# Patient Record
Sex: MALE | Race: OTHER | NOT HISPANIC OR LATINO | URBAN - METROPOLITAN AREA
[De-identification: names, ages, dates, MRNs, and addresses within clinical notes are randomized per-mention and may not be internally consistent; named-entity substitution may affect disease eponyms.]

---

## 2017-03-20 ENCOUNTER — OUTPATIENT (OUTPATIENT)
Dept: OUTPATIENT SERVICES | Facility: HOSPITAL | Age: 57
LOS: 1 days | End: 2017-03-20

## 2017-03-20 ENCOUNTER — APPOINTMENT (OUTPATIENT)
Dept: RADIOLOGY | Facility: HOSPITAL | Age: 57
End: 2017-03-20

## 2017-03-20 DIAGNOSIS — K44.9 DIAPHRAGMATIC HERNIA WITHOUT OBSTRUCTION OR GANGRENE: ICD-10-CM

## 2017-06-05 DIAGNOSIS — K29.70 GASTRITIS, UNSPECIFIED, W/OUT BLEEDING: ICD-10-CM

## 2017-06-05 DIAGNOSIS — K76.0 FATTY (CHANGE OF) LIVER, NOT ELSEWHERE CLASSIFIED: ICD-10-CM

## 2017-06-05 DIAGNOSIS — G47.33 OBSTRUCTIVE SLEEP APNEA (ADULT) (PEDIATRIC): ICD-10-CM

## 2017-06-05 DIAGNOSIS — D45 POLYCYTHEMIA VERA: ICD-10-CM

## 2017-06-05 DIAGNOSIS — B96.81 GASTRITIS, UNSPECIFIED, W/OUT BLEEDING: ICD-10-CM

## 2017-06-05 DIAGNOSIS — R73.03 PREDIABETES.: ICD-10-CM

## 2017-06-05 DIAGNOSIS — Z99.89 OBSTRUCTIVE SLEEP APNEA (ADULT) (PEDIATRIC): ICD-10-CM

## 2017-06-05 DIAGNOSIS — E78.00 PURE HYPERCHOLESTEROLEMIA, UNSPECIFIED: ICD-10-CM

## 2017-06-05 DIAGNOSIS — D64.9 ANEMIA, UNSPECIFIED: ICD-10-CM

## 2017-07-05 PROBLEM — D64.9 ANEMIA: Status: ACTIVE | Noted: 2017-07-05

## 2017-07-05 PROBLEM — G47.33 OSA ON CPAP: Status: ACTIVE | Noted: 2017-07-05

## 2017-07-05 PROBLEM — K76.0 FATTY INFILTRATION OF LIVER: Status: ACTIVE | Noted: 2017-07-05

## 2017-07-05 PROBLEM — K29.70 HELICOBACTER POSITIVE GASTRITIS: Status: ACTIVE | Noted: 2017-07-05

## 2017-07-05 PROBLEM — E78.00 HIGH CHOLESTEROL: Status: ACTIVE | Noted: 2017-07-05

## 2017-07-05 PROBLEM — R73.03 PREDIABETES: Status: ACTIVE | Noted: 2017-07-05

## 2017-07-05 PROBLEM — D45 POLYCYTHEMIA VERA: Status: ACTIVE | Noted: 2017-07-05

## 2017-07-10 ENCOUNTER — LABORATORY RESULT (OUTPATIENT)
Age: 57
End: 2017-07-10

## 2017-07-21 ENCOUNTER — APPOINTMENT (OUTPATIENT)
Dept: SURGERY | Facility: CLINIC | Age: 57
End: 2017-07-21

## 2017-07-27 ENCOUNTER — RESULT REVIEW (OUTPATIENT)
Age: 57
End: 2017-07-27

## 2017-07-27 ENCOUNTER — TRANSCRIPTION ENCOUNTER (OUTPATIENT)
Age: 57
End: 2017-07-27

## 2017-07-27 ENCOUNTER — INPATIENT (INPATIENT)
Facility: HOSPITAL | Age: 57
LOS: 0 days | Discharge: HOME | DRG: 343 | End: 2017-07-28
Attending: SURGERY | Admitting: SURGERY
Payer: COMMERCIAL

## 2017-07-27 VITALS
DIASTOLIC BLOOD PRESSURE: 87 MMHG | WEIGHT: 270.07 LBS | HEART RATE: 92 BPM | HEIGHT: 69 IN | TEMPERATURE: 99 F | SYSTOLIC BLOOD PRESSURE: 145 MMHG | RESPIRATION RATE: 18 BRPM | OXYGEN SATURATION: 92 %

## 2017-07-27 DIAGNOSIS — K37 UNSPECIFIED APPENDICITIS: ICD-10-CM

## 2017-07-27 DIAGNOSIS — Z98.890 OTHER SPECIFIED POSTPROCEDURAL STATES: Chronic | ICD-10-CM

## 2017-07-27 LAB
ALBUMIN SERPL ELPH-MCNC: 3.4 G/DL — LOW (ref 3.5–5)
ALP SERPL-CCNC: 90 U/L — SIGNIFICANT CHANGE UP (ref 40–120)
ALT FLD-CCNC: 39 U/L DA — SIGNIFICANT CHANGE UP (ref 10–60)
ANION GAP SERPL CALC-SCNC: 6 MMOL/L — SIGNIFICANT CHANGE UP (ref 5–17)
APTT BLD: 39 SEC — HIGH (ref 27.5–37.4)
AST SERPL-CCNC: 11 U/L — SIGNIFICANT CHANGE UP (ref 10–40)
BASOPHILS # BLD AUTO: 0.1 K/UL — SIGNIFICANT CHANGE UP (ref 0–0.2)
BASOPHILS NFR BLD AUTO: 1.4 % — SIGNIFICANT CHANGE UP (ref 0–2)
BILIRUB SERPL-MCNC: 0.9 MG/DL — SIGNIFICANT CHANGE UP (ref 0.2–1.2)
BUN SERPL-MCNC: 13 MG/DL — SIGNIFICANT CHANGE UP (ref 7–18)
CALCIUM SERPL-MCNC: 9 MG/DL — SIGNIFICANT CHANGE UP (ref 8.4–10.5)
CHLORIDE SERPL-SCNC: 103 MMOL/L — SIGNIFICANT CHANGE UP (ref 96–108)
CO2 SERPL-SCNC: 30 MMOL/L — SIGNIFICANT CHANGE UP (ref 22–31)
CREAT SERPL-MCNC: 0.92 MG/DL — SIGNIFICANT CHANGE UP (ref 0.5–1.3)
EOSINOPHIL # BLD AUTO: 0 K/UL — SIGNIFICANT CHANGE UP (ref 0–0.5)
EOSINOPHIL NFR BLD AUTO: 0.4 % — SIGNIFICANT CHANGE UP (ref 0–6)
GLUCOSE SERPL-MCNC: 95 MG/DL — SIGNIFICANT CHANGE UP (ref 70–99)
HCT VFR BLD CALC: 47.9 % — SIGNIFICANT CHANGE UP (ref 39–50)
HGB BLD-MCNC: 15.1 G/DL — SIGNIFICANT CHANGE UP (ref 13–17)
INR BLD: 1.17 RATIO — HIGH (ref 0.88–1.16)
LIDOCAIN IGE QN: 119 U/L — SIGNIFICANT CHANGE UP (ref 73–393)
LYMPHOCYTES # BLD AUTO: 3.4 K/UL — HIGH (ref 1–3.3)
LYMPHOCYTES # BLD AUTO: 32.6 % — SIGNIFICANT CHANGE UP (ref 13–44)
MCHC RBC-ENTMCNC: 24.9 PG — LOW (ref 27–34)
MCHC RBC-ENTMCNC: 31.6 GM/DL — LOW (ref 32–36)
MCV RBC AUTO: 78.7 FL — LOW (ref 80–100)
MONOCYTES # BLD AUTO: 0.7 K/UL — SIGNIFICANT CHANGE UP (ref 0–0.9)
MONOCYTES NFR BLD AUTO: 6.7 % — SIGNIFICANT CHANGE UP (ref 2–14)
NEUTROPHILS # BLD AUTO: 6.1 K/UL — SIGNIFICANT CHANGE UP (ref 1.8–7.4)
NEUTROPHILS NFR BLD AUTO: 58.9 % — SIGNIFICANT CHANGE UP (ref 43–77)
PLATELET # BLD AUTO: 272 K/UL — SIGNIFICANT CHANGE UP (ref 150–400)
POTASSIUM SERPL-MCNC: 3.4 MMOL/L — LOW (ref 3.5–5.3)
POTASSIUM SERPL-SCNC: 3.4 MMOL/L — LOW (ref 3.5–5.3)
PROT SERPL-MCNC: 7.7 G/DL — SIGNIFICANT CHANGE UP (ref 6–8.3)
PROTHROM AB SERPL-ACNC: 12.8 SEC — HIGH (ref 9.8–12.7)
RBC # BLD: 6.09 M/UL — HIGH (ref 4.2–5.8)
RBC # FLD: 13.9 % — SIGNIFICANT CHANGE UP (ref 10.3–14.5)
SODIUM SERPL-SCNC: 139 MMOL/L — SIGNIFICANT CHANGE UP (ref 135–145)
WBC # BLD: 10.4 K/UL — SIGNIFICANT CHANGE UP (ref 3.8–10.5)
WBC # FLD AUTO: 10.4 K/UL — SIGNIFICANT CHANGE UP (ref 3.8–10.5)

## 2017-07-27 PROCEDURE — 44970 LAPAROSCOPY APPENDECTOMY: CPT | Mod: AS

## 2017-07-27 PROCEDURE — 99285 EMERGENCY DEPT VISIT HI MDM: CPT

## 2017-07-27 PROCEDURE — 74177 CT ABD & PELVIS W/CONTRAST: CPT | Mod: 26

## 2017-07-27 PROCEDURE — 44970 LAPAROSCOPY APPENDECTOMY: CPT

## 2017-07-27 PROCEDURE — 99223 1ST HOSP IP/OBS HIGH 75: CPT | Mod: 57,AI

## 2017-07-27 PROCEDURE — 88304 TISSUE EXAM BY PATHOLOGIST: CPT | Mod: 26

## 2017-07-27 PROCEDURE — 71010: CPT | Mod: 26

## 2017-07-27 RX ORDER — METRONIDAZOLE 500 MG
500 TABLET ORAL EVERY 8 HOURS
Qty: 0 | Refills: 0 | Status: DISCONTINUED | OUTPATIENT
Start: 2017-07-27 | End: 2017-07-28

## 2017-07-27 RX ORDER — METRONIDAZOLE 500 MG
TABLET ORAL
Qty: 0 | Refills: 0 | Status: DISCONTINUED | OUTPATIENT
Start: 2017-07-27 | End: 2017-07-28

## 2017-07-27 RX ORDER — SODIUM CHLORIDE 9 MG/ML
3 INJECTION INTRAMUSCULAR; INTRAVENOUS; SUBCUTANEOUS ONCE
Qty: 0 | Refills: 0 | Status: COMPLETED | OUTPATIENT
Start: 2017-07-27 | End: 2017-07-27

## 2017-07-27 RX ORDER — METRONIDAZOLE 500 MG
500 TABLET ORAL ONCE
Qty: 0 | Refills: 0 | Status: COMPLETED | OUTPATIENT
Start: 2017-07-27 | End: 2017-07-27

## 2017-07-27 RX ORDER — CHOLECALCIFEROL (VITAMIN D3) 125 MCG
1000 CAPSULE ORAL ONCE
Qty: 0 | Refills: 0 | Status: DISCONTINUED | OUTPATIENT
Start: 2017-07-27 | End: 2017-07-28

## 2017-07-27 RX ORDER — AMLODIPINE BESYLATE 2.5 MG/1
10 TABLET ORAL DAILY
Qty: 0 | Refills: 0 | Status: DISCONTINUED | OUTPATIENT
Start: 2017-07-27 | End: 2017-07-28

## 2017-07-27 RX ORDER — LISINOPRIL 2.5 MG/1
40 TABLET ORAL DAILY
Qty: 0 | Refills: 0 | Status: DISCONTINUED | OUTPATIENT
Start: 2017-07-27 | End: 2017-07-28

## 2017-07-27 RX ORDER — MORPHINE SULFATE 50 MG/1
4 CAPSULE, EXTENDED RELEASE ORAL EVERY 4 HOURS
Qty: 0 | Refills: 0 | Status: DISCONTINUED | OUTPATIENT
Start: 2017-07-27 | End: 2017-07-28

## 2017-07-27 RX ORDER — ONDANSETRON 8 MG/1
4 TABLET, FILM COATED ORAL EVERY 6 HOURS
Qty: 0 | Refills: 0 | Status: DISCONTINUED | OUTPATIENT
Start: 2017-07-27 | End: 2017-07-28

## 2017-07-27 RX ORDER — FENTANYL CITRATE 50 UG/ML
25 INJECTION INTRAVENOUS
Qty: 0 | Refills: 0 | Status: DISCONTINUED | OUTPATIENT
Start: 2017-07-27 | End: 2017-07-27

## 2017-07-27 RX ORDER — ACETAMINOPHEN 500 MG
1000 TABLET ORAL ONCE
Qty: 0 | Refills: 0 | Status: DISCONTINUED | OUTPATIENT
Start: 2017-07-27 | End: 2017-07-27

## 2017-07-27 RX ORDER — ALLOPURINOL 300 MG
300 TABLET ORAL ONCE
Qty: 0 | Refills: 0 | Status: DISCONTINUED | OUTPATIENT
Start: 2017-07-27 | End: 2017-07-28

## 2017-07-27 RX ORDER — ENOXAPARIN SODIUM 100 MG/ML
40 INJECTION SUBCUTANEOUS DAILY
Qty: 0 | Refills: 0 | Status: DISCONTINUED | OUTPATIENT
Start: 2017-07-27 | End: 2017-07-28

## 2017-07-27 RX ORDER — MORPHINE SULFATE 50 MG/1
2 CAPSULE, EXTENDED RELEASE ORAL EVERY 4 HOURS
Qty: 0 | Refills: 0 | Status: DISCONTINUED | OUTPATIENT
Start: 2017-07-27 | End: 2017-07-28

## 2017-07-27 RX ORDER — OXYCODONE AND ACETAMINOPHEN 5; 325 MG/1; MG/1
1 TABLET ORAL EVERY 4 HOURS
Qty: 0 | Refills: 0 | Status: DISCONTINUED | OUTPATIENT
Start: 2017-07-27 | End: 2017-07-28

## 2017-07-27 RX ORDER — OXYCODONE AND ACETAMINOPHEN 5; 325 MG/1; MG/1
1 TABLET ORAL EVERY 4 HOURS
Qty: 0 | Refills: 0 | Status: DISCONTINUED | OUTPATIENT
Start: 2017-07-28 | End: 2017-07-28

## 2017-07-27 RX ORDER — CIPROFLOXACIN LACTATE 400MG/40ML
400 VIAL (ML) INTRAVENOUS EVERY 12 HOURS
Qty: 0 | Refills: 0 | Status: DISCONTINUED | OUTPATIENT
Start: 2017-07-28 | End: 2017-07-28

## 2017-07-27 RX ORDER — SODIUM CHLORIDE 9 MG/ML
1000 INJECTION, SOLUTION INTRAVENOUS
Qty: 0 | Refills: 0 | Status: DISCONTINUED | OUTPATIENT
Start: 2017-07-27 | End: 2017-07-27

## 2017-07-27 RX ORDER — HYDRALAZINE HCL 50 MG
10 TABLET ORAL ONCE
Qty: 0 | Refills: 0 | Status: COMPLETED | OUTPATIENT
Start: 2017-07-27 | End: 2017-07-27

## 2017-07-27 RX ORDER — CIPROFLOXACIN LACTATE 400MG/40ML
VIAL (ML) INTRAVENOUS
Qty: 0 | Refills: 0 | Status: DISCONTINUED | OUTPATIENT
Start: 2017-07-27 | End: 2017-07-28

## 2017-07-27 RX ORDER — MORPHINE SULFATE 50 MG/1
2 CAPSULE, EXTENDED RELEASE ORAL EVERY 6 HOURS
Qty: 0 | Refills: 0 | Status: DISCONTINUED | OUTPATIENT
Start: 2017-07-28 | End: 2017-07-28

## 2017-07-27 RX ORDER — CIPROFLOXACIN LACTATE 400MG/40ML
400 VIAL (ML) INTRAVENOUS ONCE
Qty: 0 | Refills: 0 | Status: COMPLETED | OUTPATIENT
Start: 2017-07-27 | End: 2017-07-27

## 2017-07-27 RX ORDER — SODIUM CHLORIDE 9 MG/ML
1000 INJECTION, SOLUTION INTRAVENOUS
Qty: 0 | Refills: 0 | Status: DISCONTINUED | OUTPATIENT
Start: 2017-07-27 | End: 2017-07-28

## 2017-07-27 RX ADMIN — SODIUM CHLORIDE 3 MILLILITER(S): 9 INJECTION INTRAMUSCULAR; INTRAVENOUS; SUBCUTANEOUS at 14:16

## 2017-07-27 RX ADMIN — Medication 100 MILLIGRAM(S): at 21:00

## 2017-07-27 RX ADMIN — Medication 200 MILLIGRAM(S): at 16:33

## 2017-07-27 RX ADMIN — Medication 100 MILLIGRAM(S): at 16:34

## 2017-07-27 RX ADMIN — Medication 10 MILLIGRAM(S): at 19:50

## 2017-07-27 NOTE — ED PROVIDER NOTE - MUSCULOSKELETAL, MLM
Spine appears normal, range of motion is not limited, no muscle or joint tenderness. No CVA tenderness b/l.

## 2017-07-27 NOTE — ED PROVIDER NOTE - ABDOMINAL EXAM
no rebound, no guarding/soft/nondistended/tender... MCBURNEY'S POINT TENDERNESS/tender.../no rebound, no guarding/nondistended/soft

## 2017-07-27 NOTE — ED PROVIDER NOTE - OBJECTIVE STATEMENT
55 y/o M with PMHx of HTN and PSHx of Cervical Neck Surgery presents to ED c/o diffuse abd pain x1 day. Pt reports abd pain is of gradual onset, and describes pain as sharp, aching, and 10/10 at its most severe. Pt denies fever, chills, nausea, vomiting, constipation, diarrhea, dysuria, hematuria, urinary frequency, urinary incontinence, or any other complaints. Pt also denies Hx of smoking, EtOH abuse, or drug use/abuse. Allergies: Penicillin (unknown).

## 2017-07-27 NOTE — ED PROVIDER NOTE - CHPI ED SYMPTOMS NEG
no vomiting/no constipation, no urinary frequency, no urinary incontinence/no chills/no dysuria/no diarrhea/no nausea/no hematuria/no fever

## 2017-07-27 NOTE — H&P ADULT - NSHPPHYSICALEXAM_GEN_ALL_CORE
General: uncomfortable. NAD  Abd: distended. diffusely tender but worse in the RLQ, +rebound tenderness with gaurding General: uncomfortable. NAD  Heent no icterus. left anterior fusion incision noted.  Abd: distended. diffusely tender but worse in the RLQ, +rebound tenderness with gaurding. Obese  Ext no edema  Psych affect appropriate  Neuro moves all extremities

## 2017-07-27 NOTE — ED ADULT NURSE NOTE - OBJECTIVE STATEMENT
AOX3 +ambulatory patient reports right lower quadrant pain started yesterday. Patient denies any NVD

## 2017-07-27 NOTE — ED PROVIDER NOTE - CONSTITUTIONAL, MLM
normal... Well appearing, well nourished, awake, alert, oriented to person, place, time/situation and in no apparent distress. Well appearing, well nourished, awake, alert, oriented to person, place, time/situation and in no apparent distress. Vital signs unremarkable in ED.
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
## 2017-07-27 NOTE — ED PROVIDER NOTE - MEDICAL DECISION MAKING DETAILS
55 y/o M pt presents with significant abd pain. Will r/o cholecystitis versus pancreatitis, pain control, and reassess.

## 2017-07-28 ENCOUNTER — TRANSCRIPTION ENCOUNTER (OUTPATIENT)
Age: 57
End: 2017-07-28

## 2017-07-28 VITALS
TEMPERATURE: 98 F | DIASTOLIC BLOOD PRESSURE: 62 MMHG | RESPIRATION RATE: 16 BRPM | SYSTOLIC BLOOD PRESSURE: 114 MMHG | OXYGEN SATURATION: 96 % | HEART RATE: 82 BPM

## 2017-07-28 DIAGNOSIS — E66.01 MORBID (SEVERE) OBESITY DUE TO EXCESS CALORIES: ICD-10-CM

## 2017-07-28 DIAGNOSIS — K35.80 UNSPECIFIED ACUTE APPENDICITIS: ICD-10-CM

## 2017-07-28 DIAGNOSIS — G89.18 OTHER ACUTE POSTPROCEDURAL PAIN: ICD-10-CM

## 2017-07-28 LAB
ANION GAP SERPL CALC-SCNC: 9 MMOL/L — SIGNIFICANT CHANGE UP (ref 5–17)
BASOPHILS # BLD AUTO: 0.1 K/UL — SIGNIFICANT CHANGE UP (ref 0–0.2)
BASOPHILS NFR BLD AUTO: 1.1 % — SIGNIFICANT CHANGE UP (ref 0–2)
BUN SERPL-MCNC: 9 MG/DL — SIGNIFICANT CHANGE UP (ref 7–18)
CALCIUM SERPL-MCNC: 8.7 MG/DL — SIGNIFICANT CHANGE UP (ref 8.4–10.5)
CHLORIDE SERPL-SCNC: 106 MMOL/L — SIGNIFICANT CHANGE UP (ref 96–108)
CO2 SERPL-SCNC: 27 MMOL/L — SIGNIFICANT CHANGE UP (ref 22–31)
CREAT SERPL-MCNC: 0.73 MG/DL — SIGNIFICANT CHANGE UP (ref 0.5–1.3)
EOSINOPHIL # BLD AUTO: 0 K/UL — SIGNIFICANT CHANGE UP (ref 0–0.5)
EOSINOPHIL NFR BLD AUTO: 0.3 % — SIGNIFICANT CHANGE UP (ref 0–6)
GLUCOSE SERPL-MCNC: 104 MG/DL — HIGH (ref 70–99)
HCT VFR BLD CALC: 47.5 % — SIGNIFICANT CHANGE UP (ref 39–50)
HGB BLD-MCNC: 14.6 G/DL — SIGNIFICANT CHANGE UP (ref 13–17)
LYMPHOCYTES # BLD AUTO: 2.7 K/UL — SIGNIFICANT CHANGE UP (ref 1–3.3)
LYMPHOCYTES # BLD AUTO: 29.2 % — SIGNIFICANT CHANGE UP (ref 13–44)
MCHC RBC-ENTMCNC: 24.9 PG — LOW (ref 27–34)
MCHC RBC-ENTMCNC: 30.9 GM/DL — LOW (ref 32–36)
MCV RBC AUTO: 80.7 FL — SIGNIFICANT CHANGE UP (ref 80–100)
MONOCYTES # BLD AUTO: 0.8 K/UL — SIGNIFICANT CHANGE UP (ref 0–0.9)
MONOCYTES NFR BLD AUTO: 8.8 % — SIGNIFICANT CHANGE UP (ref 2–14)
NEUTROPHILS # BLD AUTO: 5.6 K/UL — SIGNIFICANT CHANGE UP (ref 1.8–7.4)
NEUTROPHILS NFR BLD AUTO: 60.6 % — SIGNIFICANT CHANGE UP (ref 43–77)
PLATELET # BLD AUTO: 230 K/UL — SIGNIFICANT CHANGE UP (ref 150–400)
POTASSIUM SERPL-MCNC: 3.5 MMOL/L — SIGNIFICANT CHANGE UP (ref 3.5–5.3)
POTASSIUM SERPL-SCNC: 3.5 MMOL/L — SIGNIFICANT CHANGE UP (ref 3.5–5.3)
RBC # BLD: 5.88 M/UL — HIGH (ref 4.2–5.8)
RBC # FLD: 13.6 % — SIGNIFICANT CHANGE UP (ref 10.3–14.5)
SODIUM SERPL-SCNC: 142 MMOL/L — SIGNIFICANT CHANGE UP (ref 135–145)
WBC # BLD: 9.2 K/UL — SIGNIFICANT CHANGE UP (ref 3.8–10.5)
WBC # FLD AUTO: 9.2 K/UL — SIGNIFICANT CHANGE UP (ref 3.8–10.5)

## 2017-07-28 PROCEDURE — 80048 BASIC METABOLIC PNL TOTAL CA: CPT

## 2017-07-28 PROCEDURE — 86900 BLOOD TYPING SEROLOGIC ABO: CPT

## 2017-07-28 PROCEDURE — 88304 TISSUE EXAM BY PATHOLOGIST: CPT

## 2017-07-28 PROCEDURE — 96374 THER/PROPH/DIAG INJ IV PUSH: CPT | Mod: XU

## 2017-07-28 PROCEDURE — 71045 X-RAY EXAM CHEST 1 VIEW: CPT

## 2017-07-28 PROCEDURE — 99233 SBSQ HOSP IP/OBS HIGH 50: CPT

## 2017-07-28 PROCEDURE — 85610 PROTHROMBIN TIME: CPT

## 2017-07-28 PROCEDURE — 96375 TX/PRO/DX INJ NEW DRUG ADDON: CPT

## 2017-07-28 PROCEDURE — 99285 EMERGENCY DEPT VISIT HI MDM: CPT | Mod: 25

## 2017-07-28 PROCEDURE — 85730 THROMBOPLASTIN TIME PARTIAL: CPT

## 2017-07-28 PROCEDURE — 74177 CT ABD & PELVIS W/CONTRAST: CPT

## 2017-07-28 PROCEDURE — 80053 COMPREHEN METABOLIC PANEL: CPT

## 2017-07-28 PROCEDURE — 85027 COMPLETE CBC AUTOMATED: CPT

## 2017-07-28 PROCEDURE — 83690 ASSAY OF LIPASE: CPT

## 2017-07-28 PROCEDURE — 86850 RBC ANTIBODY SCREEN: CPT

## 2017-07-28 PROCEDURE — 86901 BLOOD TYPING SEROLOGIC RH(D): CPT

## 2017-07-28 PROCEDURE — 94660 CPAP INITIATION&MGMT: CPT

## 2017-07-28 RX ORDER — OXYCODONE HYDROCHLORIDE 5 MG/1
1 TABLET ORAL
Qty: 30 | Refills: 0 | OUTPATIENT
Start: 2017-07-28 | End: 2017-08-02

## 2017-07-28 RX ADMIN — Medication 100 MILLIGRAM(S): at 06:33

## 2017-07-28 RX ADMIN — MORPHINE SULFATE 2 MILLIGRAM(S): 50 CAPSULE, EXTENDED RELEASE ORAL at 07:27

## 2017-07-28 RX ADMIN — Medication 100 MILLIGRAM(S): at 14:05

## 2017-07-28 RX ADMIN — MORPHINE SULFATE 4 MILLIGRAM(S): 50 CAPSULE, EXTENDED RELEASE ORAL at 01:05

## 2017-07-28 RX ADMIN — MORPHINE SULFATE 2 MILLIGRAM(S): 50 CAPSULE, EXTENDED RELEASE ORAL at 08:21

## 2017-07-28 RX ADMIN — MORPHINE SULFATE 4 MILLIGRAM(S): 50 CAPSULE, EXTENDED RELEASE ORAL at 01:38

## 2017-07-28 RX ADMIN — AMLODIPINE BESYLATE 10 MILLIGRAM(S): 2.5 TABLET ORAL at 06:32

## 2017-07-28 RX ADMIN — Medication 200 MILLIGRAM(S): at 06:34

## 2017-07-28 RX ADMIN — LISINOPRIL 40 MILLIGRAM(S): 2.5 TABLET ORAL at 06:31

## 2017-07-28 RX ADMIN — SODIUM CHLORIDE 125 MILLILITER(S): 9 INJECTION, SOLUTION INTRAVENOUS at 01:21

## 2017-07-28 NOTE — DISCHARGE NOTE ADULT - PLAN OF CARE
wound healing s/p laparoscopic appendectomy please follow up with Dr. Lawson within 1 week   No heavy lifting or straining   Diet as tolerated continue current regimen and follow up with PCP

## 2017-07-28 NOTE — PROGRESS NOTE ADULT - SUBJECTIVE AND OBJECTIVE BOX
Post Operative Day #: 0    SUBJECTIVE:56yMale s/p lap appy, voided, no n/v, comfortable    Flatus: na           Bowel Movement: na    Pain Control Adequate: y    Nausea:    n       Vomiting: n    Diet:n      MEDICATIONS  (STANDING):  ciprofloxacin   IVPB   IV Intermittent   metroNIDAZOLE  IVPB   IV Intermittent   ciprofloxacin   IVPB 400 milliGRAM(s) IV Intermittent every 12 hours  metroNIDAZOLE  IVPB 500 milliGRAM(s) IV Intermittent every 8 hours  allopurinol 300 milliGRAM(s) Oral Once  cholecalciferol 1000 Unit(s) Oral Once  amLODIPine   Tablet 10 milliGRAM(s) Oral daily  lisinopril 40 milliGRAM(s) Oral daily  dextrose 5% + sodium chloride 0.45%. 1000 milliLiter(s) (125 mL/Hr) IV Continuous <Continuous>  enoxaparin Injectable 40 milliGRAM(s) SubCutaneous daily    MEDICATIONS  (PRN):  morphine  - Injectable 2 milliGRAM(s) IV Push every 4 hours PRN Moderate Pain (4 - 6)  oxyCODONE    5 mG/acetaminophen 325 mG 1 Tablet(s) Oral every 4 hours PRN Moderate Pain  ondansetron Injectable 4 milliGRAM(s) IV Push every 6 hours PRN Nausea  morphine  - Injectable 4 milliGRAM(s) IV Push every 4 hours PRN Severe Pain (7 - 10)      OBJECTIVE:  Vital Signs Last 24 Hrs  T(C): 37.4 (27 Jul 2017 22:40), Max: 37.4 (27 Jul 2017 22:40)  T(F): 99.3 (27 Jul 2017 22:40), Max: 99.3 (27 Jul 2017 22:40)  HR: 87 (27 Jul 2017 22:40) (71 - 97)  BP: 144/84 (27 Jul 2017 22:40) (126/82 - 164/110)  BP(mean): --  RR: 17 (27 Jul 2017 22:40) (17 - 21)  SpO2: 98% (27 Jul 2017 22:40) (92% - 100%)  I&O's Detail    27 Jul 2017 07:01  -  28 Jul 2017 00:13  --------------------------------------------------------  IN:    0.9% NaCl: 1000 mL    IV PiggyBack: 100 mL    lactated ringers.: 375 mL  Total IN: 1475 mL    OUT:  Total OUT: 0 mL    Total NET: 1475 mL                                15.1   10.4  )-----------( 272      ( 27 Jul 2017 14:17 )             47.9     27 Jul 2017 14:17    139    |  103    |  13     ----------------------------<  95     3.4     |  30     |  0.92     Ca    9.0        27 Jul 2017 14:17    TPro  7.7    /  Alb  3.4    /  TBili  0.9    /  DBili  x      /  AST  11     /  ALT  39     /  AlkPhos  90     27 Jul 2017 14:17    PT/INR - ( 27 Jul 2017 16:38 )   PT: 12.8 sec;   INR: 1.17 ratio         PTT - ( 27 Jul 2017 16:38 )  PTT:39.0 sec    Physical Exam:    Abdomen: soft, inc CDI, mild inc tenderness    Extremities: no c/c/e    stable post-op

## 2017-07-28 NOTE — DISCHARGE NOTE ADULT - PATIENT PORTAL LINK FT
“You can access the FollowHealth Patient Portal, offered by [facility], by registering with the following website: [email or web address]”
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
## 2017-07-28 NOTE — PROGRESS NOTE ADULT - SUBJECTIVE AND OBJECTIVE BOX
57 y/o male s/p laparoscopic appendectomy POD # 1. Patient examined at bedside, no complaints.   No nausea, no vomiting  Tolerating diet    T(F): 98.2 (07-28-17 @ 05:27), Max: 99.3 (07-27-17 @ 22:40)  HR: 73 (07-28-17 @ 05:27) (71 - 97)  BP: 129/82 (07-28-17 @ 05:27) (126/82 - 164/110)  RR: 16 (07-28-17 @ 05:27) (16 - 21)  SpO2: 98% (07-28-17 @ 05:27) (92% - 100%)  Wt(kg): --      07-27 @ 07:01  -  07-28 @ 07:00  --------------------------------------------------------  IN:    0.9% NaCl: 1000 mL    IV PiggyBack: 100 mL    lactated ringers.: 375 mL  Total IN: 1475 mL    OUT:  Total OUT: 0 mL    Total NET: 1475 mL                              14.6   9.2   )-----------( 230      ( 28 Jul 2017 06:04 )             47.5   07-28    142  |  106  |  9   ----------------------------<  104<H>  3.5   |  27  |  0.73    Ca    8.7      28 Jul 2017 06:04    TPro  7.7  /  Alb  3.4<L>  /  TBili  0.9  /  DBili  x   /  AST  11  /  ALT  39  /  AlkPhos  90  07-27        Physical Exam  General: AAOx3, No acute distress  Skin: No jaundice, no icterus  Abdomen: soft, mild perincisional tenderness, nondistended, no rebound tenderness, no guarding, no palpable masses  : Normal external genitalia  Extremities: non edematous, no calf pain bilaterally

## 2017-07-28 NOTE — DISCHARGE NOTE ADULT - OTHER SIGNIFICANT FINDINGS
Patient ID: [id number] Patient Name: GUICHO CHAVEZ   YOB: 1960 Sex: M        EXAM: CT ABDOMEN AND PELVIS OC IC      PROCEDURE DATE: 07/27/2017        INTERPRETATION: CT ABDOMEN AND PELVIS WITH IV CONTRAST    CLINICAL STATEMENT: Abdominal pain.    COMPARISON: None.    TECHNIQUE: CT scan of the abdomen and pelvis was performed with IV, with  oral contrast. Multiplanar reformations were made.    FINDINGS:  Bibasilar subsegmental atelectasis. No pleural effusion. Diffuse fatty  infiltration of the liver. Gallbladder, pancreas, spleen and adrenal glands  are unremarkable. Kidneys enhance symmetrically. No hydronephrosis. 1.6 cm  right renal cyst.    Appendix is enlarged, measuring 1.1 cm in greatest diameter, with  surrounding periappendiceal fat stranding. Mild wall thickening of the  adjacent cecum is noted are likely related to contiguous inflammation. No  evidence of abscess. GI tract is unobstructed. No free air.    No bulky adenopathy. Normal caliber abdominal aorta. Urinary bladder is  unremarkable. Prostate is mildly enlarged, measuring 5.2 cm in transverse  diameter. Seminal vesicles are symmetric. No free pelvic fluid. Mild  degenerative changes in the spine. Bilateral fat-containing inguinal hernias  are noted.    IMPRESSION:  Acute appendicitis. No free air or evidence of abscess.    Diffuse hepatic steatosis.    Findings were discussed with Dr. Yarbrough on 7/27/2017 3:42 PM with  readback.                DACIA CHRISTIANSON M.D., ATTENDING RADIOLOGIST  This document has been electronically signed. Jul 27 2017 3:46PM

## 2017-07-28 NOTE — PROGRESS NOTE ADULT - SUBJECTIVE AND OBJECTIVE BOX
Chief Complaint:     HPI:   56y Male s/p laparoscopic appendectomy POD # 1. Pt sitting in chair, NAD. Reports minimal incisional pain with movement. Tolerating regular diet well.  Denies nausea, vomiting. Reports passing gas.  States was ambulating without difficulties.        PAIN SCORE:         SCALE USED: 2/10 (1-10 VNRS)    Allergies    penicillin (Unknown)    Intolerances      MEDICATIONS  (STANDING):  ciprofloxacin   IVPB   IV Intermittent   metroNIDAZOLE  IVPB   IV Intermittent   ciprofloxacin   IVPB 400 milliGRAM(s) IV Intermittent every 12 hours  metroNIDAZOLE  IVPB 500 milliGRAM(s) IV Intermittent every 8 hours  allopurinol 300 milliGRAM(s) Oral Once  cholecalciferol 1000 Unit(s) Oral Once  amLODIPine   Tablet 10 milliGRAM(s) Oral daily  lisinopril 40 milliGRAM(s) Oral daily  dextrose 5% + sodium chloride 0.45%. 1000 milliLiter(s) (125 mL/Hr) IV Continuous <Continuous>  enoxaparin Injectable 40 milliGRAM(s) SubCutaneous daily    MEDICATIONS  (PRN):  ondansetron Injectable 4 milliGRAM(s) IV Push every 6 hours PRN Nausea  oxyCODONE    5 mG/acetaminophen 325 mG 1 Tablet(s) Oral every 4 hours PRN Moderate Pain (4 - 6)      PHYSICAL EXAM:    Vital Signs Last 24 Hrs  T(C): 36.7 (28 Jul 2017 15:03), Max: 37.4 (27 Jul 2017 22:40)  T(F): 98 (28 Jul 2017 15:03), Max: 99.3 (27 Jul 2017 22:40)  HR: 82 (28 Jul 2017 15:03) (71 - 97)  BP: 114/62 (28 Jul 2017 15:03) (114/62 - 164/110)  BP(mean): --  RR: 16 (28 Jul 2017 15:03) (16 - 21)  SpO2: 96% (28 Jul 2017 15:03) (93% - 100%)             LABS:                          14.6   9.2   )-----------( 230      ( 28 Jul 2017 06:04 )             47.5     07-28    142  |  106  |  9   ----------------------------<  104<H>  3.5   |  27  |  0.73    Ca    8.7      28 Jul 2017 06:04    TPro  7.7  /  Alb  3.4<L>  /  TBili  0.9  /  DBili  x   /  AST  11  /  ALT  39  /  AlkPhos  90  07-27    PT/INR - ( 27 Jul 2017 16:38 )   PT: 12.8 sec;   INR: 1.17 ratio         PTT - ( 27 Jul 2017 16:38 )  PTT:39.0 sec      Drug Screen:        RADIOLOGY:

## 2017-07-28 NOTE — DISCHARGE NOTE ADULT - CARE PLAN
Principal Discharge DX:	Acute appendicitis, unspecified acute appendicitis type  Goal:	wound healing  Instructions for follow-up, activity and diet:	s/p laparoscopic appendectomy please follow up with Dr. Lawson within 1 week   No heavy lifting or straining   Diet as tolerated  Secondary Diagnosis:	Gout  Secondary Diagnosis:	HTN (hypertension)  Goal:	continue current regimen and follow up with PCP

## 2017-07-28 NOTE — PROGRESS NOTE ADULT - PROBLEM SELECTOR PLAN 1
1. regular diet   2./ oob   3. prn pain control   4. d/c planning
sp lap appendectomy POD #1   -morphine discontinued  -Percocet as needed for moderate pain  -OOB, encouraged ambulation   -incentive spirometer reinforced   -discharge home today

## 2017-07-28 NOTE — DISCHARGE NOTE ADULT - MEDICATION SUMMARY - MEDICATIONS TO TAKE
I will START or STAY ON the medications listed below when I get home from the hospital:    acetaminophen-oxycodone 325 mg-5 mg oral tablet  -- 1 tab(s) by mouth every 4 hours, As needed, Moderate Pain (4 - 6) MDD:6 tabs  -- Indication: For prn pain     allopurinol 300 mg oral tablet  -- 1 tab(s) by mouth once a day  -- Indication: For Gout     amlodipine-benazepril 10 mg-40 mg oral capsule  -- 1 cap(s) by mouth once a day  -- Indication: For HTN (hypertension)    Vitamin D3 1000 intl units oral capsule  -- 1 cap(s) by mouth once a day  -- Indication: For vitamin

## 2017-07-31 DIAGNOSIS — Z99.81 DEPENDENCE ON SUPPLEMENTAL OXYGEN: ICD-10-CM

## 2017-07-31 DIAGNOSIS — E66.01 MORBID (SEVERE) OBESITY DUE TO EXCESS CALORIES: ICD-10-CM

## 2017-07-31 DIAGNOSIS — G47.33 OBSTRUCTIVE SLEEP APNEA (ADULT) (PEDIATRIC): ICD-10-CM

## 2017-07-31 DIAGNOSIS — K35.80 UNSPECIFIED ACUTE APPENDICITIS: ICD-10-CM

## 2017-07-31 DIAGNOSIS — M10.9 GOUT, UNSPECIFIED: ICD-10-CM

## 2017-07-31 DIAGNOSIS — Z88.0 ALLERGY STATUS TO PENICILLIN: ICD-10-CM

## 2017-07-31 DIAGNOSIS — I10 ESSENTIAL (PRIMARY) HYPERTENSION: ICD-10-CM

## 2017-08-04 ENCOUNTER — APPOINTMENT (OUTPATIENT)
Dept: BARIATRICS | Facility: CLINIC | Age: 57
End: 2017-08-04
Payer: COMMERCIAL

## 2017-08-04 VITALS
BODY MASS INDEX: 38.71 KG/M2 | HEIGHT: 69 IN | SYSTOLIC BLOOD PRESSURE: 129 MMHG | TEMPERATURE: 97.9 F | DIASTOLIC BLOOD PRESSURE: 85 MMHG | OXYGEN SATURATION: 98 % | WEIGHT: 261.38 LBS | HEART RATE: 54 BPM

## 2017-08-04 DIAGNOSIS — I10 ESSENTIAL (PRIMARY) HYPERTENSION: ICD-10-CM

## 2017-08-04 DIAGNOSIS — Z48.89 ENCOUNTER FOR OTHER SPECIFIED SURGICAL AFTERCARE: ICD-10-CM

## 2017-08-04 PROCEDURE — 99024 POSTOP FOLLOW-UP VISIT: CPT

## 2017-08-04 RX ORDER — PANTOPRAZOLE SODIUM 40 MG/1
40 GRANULE, DELAYED RELEASE ORAL
Refills: 0 | Status: DISCONTINUED | COMMUNITY
End: 2017-08-04

## 2017-08-04 RX ORDER — MULTIVIT-MIN/FOLIC/VIT K/LYCOP 400-300MCG
25 MCG TABLET ORAL
Refills: 0 | Status: ACTIVE | COMMUNITY

## 2017-08-23 ENCOUNTER — OUTPATIENT (OUTPATIENT)
Dept: OUTPATIENT SERVICES | Facility: HOSPITAL | Age: 57
LOS: 1 days | End: 2017-08-23
Payer: COMMERCIAL

## 2017-08-23 VITALS
HEART RATE: 70 BPM | SYSTOLIC BLOOD PRESSURE: 148 MMHG | DIASTOLIC BLOOD PRESSURE: 99 MMHG | HEIGHT: 69 IN | OXYGEN SATURATION: 97 % | WEIGHT: 268.96 LBS | TEMPERATURE: 98 F | RESPIRATION RATE: 16 BRPM

## 2017-08-23 DIAGNOSIS — G47.33 OBSTRUCTIVE SLEEP APNEA (ADULT) (PEDIATRIC): ICD-10-CM

## 2017-08-23 DIAGNOSIS — E66.01 MORBID (SEVERE) OBESITY DUE TO EXCESS CALORIES: ICD-10-CM

## 2017-08-23 DIAGNOSIS — Z90.49 ACQUIRED ABSENCE OF OTHER SPECIFIED PARTS OF DIGESTIVE TRACT: Chronic | ICD-10-CM

## 2017-08-23 DIAGNOSIS — Z01.818 ENCOUNTER FOR OTHER PREPROCEDURAL EXAMINATION: ICD-10-CM

## 2017-08-23 DIAGNOSIS — Z98.890 OTHER SPECIFIED POSTPROCEDURAL STATES: Chronic | ICD-10-CM

## 2017-08-23 DIAGNOSIS — Z98.1 ARTHRODESIS STATUS: Chronic | ICD-10-CM

## 2017-08-23 LAB
ALBUMIN SERPL ELPH-MCNC: 4.2 G/DL — SIGNIFICANT CHANGE UP (ref 3.3–5)
ALP SERPL-CCNC: 94 U/L — SIGNIFICANT CHANGE UP (ref 40–120)
ALT FLD-CCNC: 42 U/L — SIGNIFICANT CHANGE UP (ref 10–45)
ANION GAP SERPL CALC-SCNC: 16 MMOL/L — SIGNIFICANT CHANGE UP (ref 5–17)
AST SERPL-CCNC: 24 U/L — SIGNIFICANT CHANGE UP (ref 10–40)
BILIRUB SERPL-MCNC: 0.4 MG/DL — SIGNIFICANT CHANGE UP (ref 0.2–1.2)
BLD GP AB SCN SERPL QL: NEGATIVE — SIGNIFICANT CHANGE UP
BUN SERPL-MCNC: 22 MG/DL — SIGNIFICANT CHANGE UP (ref 7–23)
CALCIUM SERPL-MCNC: 9.6 MG/DL — SIGNIFICANT CHANGE UP (ref 8.4–10.5)
CHLORIDE SERPL-SCNC: 103 MMOL/L — SIGNIFICANT CHANGE UP (ref 96–108)
CO2 SERPL-SCNC: 25 MMOL/L — SIGNIFICANT CHANGE UP (ref 22–31)
CREAT SERPL-MCNC: 0.62 MG/DL — SIGNIFICANT CHANGE UP (ref 0.5–1.3)
GLUCOSE SERPL-MCNC: 87 MG/DL — SIGNIFICANT CHANGE UP (ref 70–99)
HBA1C BLD-MCNC: 6.1 % — HIGH (ref 4–5.6)
HCT VFR BLD CALC: 45.2 % — SIGNIFICANT CHANGE UP (ref 39–50)
HGB BLD-MCNC: 14.4 G/DL — SIGNIFICANT CHANGE UP (ref 13–17)
MCHC RBC-ENTMCNC: 24.2 PG — LOW (ref 27–34)
MCHC RBC-ENTMCNC: 31.9 GM/DL — LOW (ref 32–36)
MCV RBC AUTO: 75.8 FL — LOW (ref 80–100)
PLATELET # BLD AUTO: 289 K/UL — SIGNIFICANT CHANGE UP (ref 150–400)
POTASSIUM SERPL-MCNC: 4.4 MMOL/L — SIGNIFICANT CHANGE UP (ref 3.5–5.3)
POTASSIUM SERPL-SCNC: 4.4 MMOL/L — SIGNIFICANT CHANGE UP (ref 3.5–5.3)
PROT SERPL-MCNC: 8 G/DL — SIGNIFICANT CHANGE UP (ref 6–8.3)
RBC # BLD: 5.96 M/UL — HIGH (ref 4.2–5.8)
RBC # FLD: 15.6 % — HIGH (ref 10.3–14.5)
RH IG SCN BLD-IMP: POSITIVE — SIGNIFICANT CHANGE UP
SODIUM SERPL-SCNC: 144 MMOL/L — SIGNIFICANT CHANGE UP (ref 135–145)
WBC # BLD: 8.06 K/UL — SIGNIFICANT CHANGE UP (ref 3.8–10.5)
WBC # FLD AUTO: 8.06 K/UL — SIGNIFICANT CHANGE UP (ref 3.8–10.5)

## 2017-08-23 PROCEDURE — G0463: CPT

## 2017-08-23 PROCEDURE — 80053 COMPREHEN METABOLIC PANEL: CPT

## 2017-08-23 PROCEDURE — 83036 HEMOGLOBIN GLYCOSYLATED A1C: CPT

## 2017-08-23 PROCEDURE — 86900 BLOOD TYPING SEROLOGIC ABO: CPT

## 2017-08-23 PROCEDURE — 85027 COMPLETE CBC AUTOMATED: CPT

## 2017-08-23 PROCEDURE — 86901 BLOOD TYPING SEROLOGIC RH(D): CPT

## 2017-08-23 PROCEDURE — 86850 RBC ANTIBODY SCREEN: CPT

## 2017-08-23 RX ORDER — HEPARIN SODIUM 5000 [USP'U]/ML
5000 INJECTION INTRAVENOUS; SUBCUTANEOUS ONCE
Qty: 0 | Refills: 0 | Status: COMPLETED | OUTPATIENT
Start: 2017-09-12 | End: 2017-09-12

## 2017-08-23 RX ORDER — SODIUM CHLORIDE 9 MG/ML
3 INJECTION INTRAMUSCULAR; INTRAVENOUS; SUBCUTANEOUS EVERY 8 HOURS
Qty: 0 | Refills: 0 | Status: DISCONTINUED | OUTPATIENT
Start: 2017-09-12 | End: 2017-09-12

## 2017-08-23 RX ORDER — VANCOMYCIN HCL 1 G
1500 VIAL (EA) INTRAVENOUS ONCE
Qty: 0 | Refills: 0 | Status: DISCONTINUED | OUTPATIENT
Start: 2017-09-12 | End: 2017-09-13

## 2017-08-23 RX ORDER — LIDOCAINE HCL 20 MG/ML
0.2 VIAL (ML) INJECTION ONCE
Qty: 0 | Refills: 0 | Status: DISCONTINUED | OUTPATIENT
Start: 2017-09-12 | End: 2017-09-13

## 2017-08-23 NOTE — H&P PST ADULT - PRIMARY CARE PROVIDER
[facility] [phone] last seen one month ago Dr.Daphna-Raquel Trujillo # [phone] fax [phone] has appointment on 8/28/17
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
## 2017-08-23 NOTE — H&P PST ADULT - PSH
H/O neck surgery  2 surgeries. c3-c5 fusion ( front )  and c2-c7 fusion ( back )  S/P appendectomy  7/27/17 H/O spinal fusion  cervical  x2 :  c3-c5  ( front )  and c2-c7  ( back  )  S/P appendectomy  7/27/17

## 2017-08-28 ENCOUNTER — APPOINTMENT (OUTPATIENT)
Dept: SURGERY | Facility: CLINIC | Age: 57
End: 2017-08-28
Payer: COMMERCIAL

## 2017-08-28 PROCEDURE — 99215 OFFICE O/P EST HI 40 MIN: CPT

## 2017-09-12 ENCOUNTER — RESULT REVIEW (OUTPATIENT)
Age: 57
End: 2017-09-12

## 2017-09-12 ENCOUNTER — TRANSCRIPTION ENCOUNTER (OUTPATIENT)
Age: 57
End: 2017-09-12

## 2017-09-12 ENCOUNTER — APPOINTMENT (OUTPATIENT)
Dept: SURGERY | Facility: HOSPITAL | Age: 57
End: 2017-09-12
Payer: COMMERCIAL

## 2017-09-12 ENCOUNTER — INPATIENT (INPATIENT)
Facility: HOSPITAL | Age: 57
LOS: 0 days | Discharge: HOME | DRG: 621 | End: 2017-09-13
Attending: SURGERY | Admitting: SURGERY
Payer: COMMERCIAL

## 2017-09-12 VITALS
TEMPERATURE: 99 F | HEART RATE: 73 BPM | OXYGEN SATURATION: 97 % | DIASTOLIC BLOOD PRESSURE: 78 MMHG | SYSTOLIC BLOOD PRESSURE: 128 MMHG | HEIGHT: 69 IN | RESPIRATION RATE: 18 BRPM | WEIGHT: 259.04 LBS

## 2017-09-12 DIAGNOSIS — Z98.1 ARTHRODESIS STATUS: Chronic | ICD-10-CM

## 2017-09-12 DIAGNOSIS — E66.01 MORBID (SEVERE) OBESITY DUE TO EXCESS CALORIES: ICD-10-CM

## 2017-09-12 DIAGNOSIS — Z90.49 ACQUIRED ABSENCE OF OTHER SPECIFIED PARTS OF DIGESTIVE TRACT: Chronic | ICD-10-CM

## 2017-09-12 LAB
ANION GAP SERPL CALC-SCNC: 15 MMOL/L — SIGNIFICANT CHANGE UP (ref 5–17)
ANION GAP SERPL CALC-SCNC: 18 MMOL/L — HIGH (ref 5–17)
BASOPHILS # BLD AUTO: 0 K/UL — SIGNIFICANT CHANGE UP (ref 0–0.2)
BASOPHILS NFR BLD AUTO: 0.2 % — SIGNIFICANT CHANGE UP (ref 0–2)
BUN SERPL-MCNC: 10 MG/DL — SIGNIFICANT CHANGE UP (ref 7–23)
BUN SERPL-MCNC: 7 MG/DL — SIGNIFICANT CHANGE UP (ref 7–23)
CALCIUM SERPL-MCNC: 8.3 MG/DL — LOW (ref 8.4–10.5)
CALCIUM SERPL-MCNC: 8.8 MG/DL — SIGNIFICANT CHANGE UP (ref 8.4–10.5)
CHLORIDE SERPL-SCNC: 100 MMOL/L — SIGNIFICANT CHANGE UP (ref 96–108)
CHLORIDE SERPL-SCNC: 99 MMOL/L — SIGNIFICANT CHANGE UP (ref 96–108)
CO2 SERPL-SCNC: 21 MMOL/L — LOW (ref 22–31)
CO2 SERPL-SCNC: 23 MMOL/L — SIGNIFICANT CHANGE UP (ref 22–31)
CREAT SERPL-MCNC: 0.66 MG/DL — SIGNIFICANT CHANGE UP (ref 0.5–1.3)
CREAT SERPL-MCNC: 0.69 MG/DL — SIGNIFICANT CHANGE UP (ref 0.5–1.3)
EOSINOPHIL # BLD AUTO: 0.1 K/UL — SIGNIFICANT CHANGE UP (ref 0–0.5)
EOSINOPHIL NFR BLD AUTO: 1 % — SIGNIFICANT CHANGE UP (ref 0–6)
GLUCOSE SERPL-MCNC: 141 MG/DL — HIGH (ref 70–99)
GLUCOSE SERPL-MCNC: 154 MG/DL — HIGH (ref 70–99)
HCT VFR BLD CALC: 39.6 % — SIGNIFICANT CHANGE UP (ref 39–50)
HCT VFR BLD CALC: 44.9 % — SIGNIFICANT CHANGE UP (ref 39–50)
HGB BLD-MCNC: 13.1 G/DL — SIGNIFICANT CHANGE UP (ref 13–17)
HGB BLD-MCNC: 14.5 G/DL — SIGNIFICANT CHANGE UP (ref 13–17)
LYMPHOCYTES # BLD AUTO: 2.8 K/UL — SIGNIFICANT CHANGE UP (ref 1–3.3)
LYMPHOCYTES # BLD AUTO: 39.8 % — SIGNIFICANT CHANGE UP (ref 13–44)
MAGNESIUM SERPL-MCNC: 2.3 MG/DL — SIGNIFICANT CHANGE UP (ref 1.6–2.6)
MCHC RBC-ENTMCNC: 25.6 PG — LOW (ref 27–34)
MCHC RBC-ENTMCNC: 26.1 PG — LOW (ref 27–34)
MCHC RBC-ENTMCNC: 32.4 GM/DL — SIGNIFICANT CHANGE UP (ref 32–36)
MCHC RBC-ENTMCNC: 33 GM/DL — SIGNIFICANT CHANGE UP (ref 32–36)
MCV RBC AUTO: 79.1 FL — LOW (ref 80–100)
MCV RBC AUTO: 79.1 FL — LOW (ref 80–100)
MONOCYTES # BLD AUTO: 0.3 K/UL — SIGNIFICANT CHANGE UP (ref 0–0.9)
MONOCYTES NFR BLD AUTO: 4.5 % — SIGNIFICANT CHANGE UP (ref 2–14)
NEUTROPHILS # BLD AUTO: 3.8 K/UL — SIGNIFICANT CHANGE UP (ref 1.8–7.4)
NEUTROPHILS NFR BLD AUTO: 54.6 % — SIGNIFICANT CHANGE UP (ref 43–77)
PHOSPHATE SERPL-MCNC: 1.9 MG/DL — LOW (ref 2.5–4.5)
PHOSPHATE SERPL-MCNC: 3.1 MG/DL — SIGNIFICANT CHANGE UP (ref 2.5–4.5)
PLATELET # BLD AUTO: 216 K/UL — SIGNIFICANT CHANGE UP (ref 150–400)
PLATELET # BLD AUTO: 234 K/UL — SIGNIFICANT CHANGE UP (ref 150–400)
POTASSIUM SERPL-MCNC: 3.5 MMOL/L — SIGNIFICANT CHANGE UP (ref 3.5–5.3)
POTASSIUM SERPL-MCNC: 4.1 MMOL/L — SIGNIFICANT CHANGE UP (ref 3.5–5.3)
POTASSIUM SERPL-SCNC: 3.5 MMOL/L — SIGNIFICANT CHANGE UP (ref 3.5–5.3)
POTASSIUM SERPL-SCNC: 4.1 MMOL/L — SIGNIFICANT CHANGE UP (ref 3.5–5.3)
RBC # BLD: 5.01 M/UL — SIGNIFICANT CHANGE UP (ref 4.2–5.8)
RBC # BLD: 5.68 M/UL — SIGNIFICANT CHANGE UP (ref 4.2–5.8)
RBC # FLD: 13.4 % — SIGNIFICANT CHANGE UP (ref 10.3–14.5)
RBC # FLD: 13.7 % — SIGNIFICANT CHANGE UP (ref 10.3–14.5)
RH IG SCN BLD-IMP: POSITIVE — SIGNIFICANT CHANGE UP
SODIUM SERPL-SCNC: 137 MMOL/L — SIGNIFICANT CHANGE UP (ref 135–145)
SODIUM SERPL-SCNC: 139 MMOL/L — SIGNIFICANT CHANGE UP (ref 135–145)
WBC # BLD: 6.9 K/UL — SIGNIFICANT CHANGE UP (ref 3.8–10.5)
WBC # BLD: 7.2 K/UL — SIGNIFICANT CHANGE UP (ref 3.8–10.5)
WBC # FLD AUTO: 6.9 K/UL — SIGNIFICANT CHANGE UP (ref 3.8–10.5)
WBC # FLD AUTO: 7.2 K/UL — SIGNIFICANT CHANGE UP (ref 3.8–10.5)

## 2017-09-12 PROCEDURE — 88305 TISSUE EXAM BY PATHOLOGIST: CPT | Mod: 26

## 2017-09-12 PROCEDURE — 43775 LAP SLEEVE GASTRECTOMY: CPT

## 2017-09-12 PROCEDURE — 51702 INSERT TEMP BLADDER CATH: CPT | Mod: 59

## 2017-09-12 PROCEDURE — 93010 ELECTROCARDIOGRAM REPORT: CPT

## 2017-09-12 RX ORDER — PANTOPRAZOLE SODIUM 20 MG/1
40 TABLET, DELAYED RELEASE ORAL DAILY
Qty: 0 | Refills: 0 | Status: DISCONTINUED | OUTPATIENT
Start: 2017-09-12 | End: 2017-09-13

## 2017-09-12 RX ORDER — ACETAMINOPHEN 500 MG
1000 TABLET ORAL ONCE
Qty: 0 | Refills: 0 | Status: COMPLETED | OUTPATIENT
Start: 2017-09-12 | End: 2017-09-12

## 2017-09-12 RX ORDER — ACETAMINOPHEN 500 MG
1000 TABLET ORAL ONCE
Qty: 0 | Refills: 0 | Status: COMPLETED | OUTPATIENT
Start: 2017-09-12 | End: 2017-09-13

## 2017-09-12 RX ORDER — POTASSIUM CHLORIDE 20 MEQ
10 PACKET (EA) ORAL
Qty: 0 | Refills: 0 | Status: DISCONTINUED | OUTPATIENT
Start: 2017-09-12 | End: 2017-09-12

## 2017-09-12 RX ORDER — HYOSCYAMINE SULFATE 0.13 MG
0.12 TABLET ORAL EVERY 6 HOURS
Qty: 0 | Refills: 0 | Status: DISCONTINUED | OUTPATIENT
Start: 2017-09-12 | End: 2017-09-13

## 2017-09-12 RX ORDER — ACETAMINOPHEN 500 MG
1000 TABLET ORAL ONCE
Qty: 0 | Refills: 0 | Status: DISCONTINUED | OUTPATIENT
Start: 2017-09-12 | End: 2017-09-13

## 2017-09-12 RX ORDER — POTASSIUM PHOSPHATE, MONOBASIC POTASSIUM PHOSPHATE, DIBASIC 236; 224 MG/ML; MG/ML
15 INJECTION, SOLUTION INTRAVENOUS ONCE
Qty: 0 | Refills: 0 | Status: DISCONTINUED | OUTPATIENT
Start: 2017-09-12 | End: 2017-09-12

## 2017-09-12 RX ORDER — VANCOMYCIN HCL 1 G
1000 VIAL (EA) INTRAVENOUS ONCE
Qty: 0 | Refills: 0 | Status: COMPLETED | OUTPATIENT
Start: 2017-09-13 | End: 2017-09-13

## 2017-09-12 RX ORDER — KETOROLAC TROMETHAMINE 30 MG/ML
30 SYRINGE (ML) INJECTION EVERY 6 HOURS
Qty: 0 | Refills: 0 | Status: DISCONTINUED | OUTPATIENT
Start: 2017-09-12 | End: 2017-09-12

## 2017-09-12 RX ORDER — METOCLOPRAMIDE HCL 10 MG
10 TABLET ORAL ONCE
Qty: 0 | Refills: 0 | Status: DISCONTINUED | OUTPATIENT
Start: 2017-09-12 | End: 2017-09-13

## 2017-09-12 RX ORDER — FAMOTIDINE 10 MG/ML
20 INJECTION INTRAVENOUS ONCE
Qty: 0 | Refills: 0 | Status: COMPLETED | OUTPATIENT
Start: 2017-09-12 | End: 2017-09-12

## 2017-09-12 RX ORDER — SODIUM CHLORIDE 9 MG/ML
500 INJECTION, SOLUTION INTRAVENOUS ONCE
Qty: 0 | Refills: 0 | Status: COMPLETED | OUTPATIENT
Start: 2017-09-12 | End: 2017-09-12

## 2017-09-12 RX ORDER — SODIUM CHLORIDE 9 MG/ML
1000 INJECTION, SOLUTION INTRAVENOUS
Qty: 0 | Refills: 0 | Status: COMPLETED | OUTPATIENT
Start: 2017-09-12 | End: 2017-09-12

## 2017-09-12 RX ORDER — HYDROMORPHONE HYDROCHLORIDE 2 MG/ML
0.25 INJECTION INTRAMUSCULAR; INTRAVENOUS; SUBCUTANEOUS
Qty: 0 | Refills: 0 | Status: DISCONTINUED | OUTPATIENT
Start: 2017-09-12 | End: 2017-09-13

## 2017-09-12 RX ORDER — HYDRALAZINE HCL 50 MG
10 TABLET ORAL EVERY 6 HOURS
Qty: 0 | Refills: 0 | Status: DISCONTINUED | OUTPATIENT
Start: 2017-09-12 | End: 2017-09-13

## 2017-09-12 RX ORDER — ONDANSETRON 8 MG/1
4 TABLET, FILM COATED ORAL EVERY 6 HOURS
Qty: 0 | Refills: 0 | Status: DISCONTINUED | OUTPATIENT
Start: 2017-09-12 | End: 2017-09-13

## 2017-09-12 RX ORDER — SODIUM CHLORIDE 9 MG/ML
1000 INJECTION, SOLUTION INTRAVENOUS
Qty: 0 | Refills: 0 | Status: DISCONTINUED | OUTPATIENT
Start: 2017-09-12 | End: 2017-09-13

## 2017-09-12 RX ORDER — FENTANYL CITRATE 50 UG/ML
25 INJECTION INTRAVENOUS
Qty: 0 | Refills: 0 | Status: DISCONTINUED | OUTPATIENT
Start: 2017-09-12 | End: 2017-09-12

## 2017-09-12 RX ORDER — PANTOPRAZOLE SODIUM 20 MG/1
40 TABLET, DELAYED RELEASE ORAL ONCE
Qty: 0 | Refills: 0 | Status: COMPLETED | OUTPATIENT
Start: 2017-09-12 | End: 2017-09-12

## 2017-09-12 RX ORDER — SODIUM CHLORIDE 9 MG/ML
1000 INJECTION, SOLUTION INTRAVENOUS
Qty: 0 | Refills: 0 | Status: DISCONTINUED | OUTPATIENT
Start: 2017-09-12 | End: 2017-09-12

## 2017-09-12 RX ORDER — POTASSIUM CHLORIDE 20 MEQ
10 PACKET (EA) ORAL
Qty: 0 | Refills: 0 | Status: COMPLETED | OUTPATIENT
Start: 2017-09-12 | End: 2017-09-12

## 2017-09-12 RX ORDER — HEPARIN SODIUM 5000 [USP'U]/ML
5000 INJECTION INTRAVENOUS; SUBCUTANEOUS EVERY 8 HOURS
Qty: 0 | Refills: 0 | Status: DISCONTINUED | OUTPATIENT
Start: 2017-09-12 | End: 2017-09-13

## 2017-09-12 RX ADMIN — Medication 1000 MILLIGRAM(S): at 17:33

## 2017-09-12 RX ADMIN — Medication 0.12 MILLIGRAM(S): at 19:51

## 2017-09-12 RX ADMIN — SODIUM CHLORIDE 1500 MILLILITER(S): 9 INJECTION, SOLUTION INTRAVENOUS at 21:41

## 2017-09-12 RX ADMIN — PANTOPRAZOLE SODIUM 40 MILLIGRAM(S): 20 TABLET, DELAYED RELEASE ORAL at 12:08

## 2017-09-12 RX ADMIN — FENTANYL CITRATE 25 MICROGRAM(S): 50 INJECTION INTRAVENOUS at 12:00

## 2017-09-12 RX ADMIN — FAMOTIDINE 20 MILLIGRAM(S): 10 INJECTION INTRAVENOUS at 12:59

## 2017-09-12 RX ADMIN — HEPARIN SODIUM 5000 UNIT(S): 5000 INJECTION INTRAVENOUS; SUBCUTANEOUS at 07:07

## 2017-09-12 RX ADMIN — SODIUM CHLORIDE 150 MILLILITER(S): 9 INJECTION, SOLUTION INTRAVENOUS at 11:44

## 2017-09-12 RX ADMIN — ONDANSETRON 4 MILLIGRAM(S): 8 TABLET, FILM COATED ORAL at 19:54

## 2017-09-12 RX ADMIN — Medication 0.12 MILLIGRAM(S): at 12:08

## 2017-09-12 RX ADMIN — HEPARIN SODIUM 5000 UNIT(S): 5000 INJECTION INTRAVENOUS; SUBCUTANEOUS at 21:37

## 2017-09-12 RX ADMIN — Medication 400 MILLIGRAM(S): at 16:59

## 2017-09-12 RX ADMIN — FENTANYL CITRATE 25 MICROGRAM(S): 50 INJECTION INTRAVENOUS at 11:31

## 2017-09-12 RX ADMIN — Medication 100 MILLIEQUIVALENT(S): at 13:42

## 2017-09-12 RX ADMIN — Medication 100 MILLIEQUIVALENT(S): at 12:37

## 2017-09-12 RX ADMIN — HYDROMORPHONE HYDROCHLORIDE 0.25 MILLIGRAM(S): 2 INJECTION INTRAMUSCULAR; INTRAVENOUS; SUBCUTANEOUS at 14:00

## 2017-09-12 RX ADMIN — Medication 100 MILLIEQUIVALENT(S): at 15:08

## 2017-09-12 RX ADMIN — Medication 1000 MILLIGRAM(S): at 20:19

## 2017-09-12 RX ADMIN — Medication 30 MILLIGRAM(S): at 22:07

## 2017-09-12 RX ADMIN — HYDROMORPHONE HYDROCHLORIDE 0.25 MILLIGRAM(S): 2 INJECTION INTRAMUSCULAR; INTRAVENOUS; SUBCUTANEOUS at 13:28

## 2017-09-12 RX ADMIN — SODIUM CHLORIDE 150 MILLILITER(S): 9 INJECTION, SOLUTION INTRAVENOUS at 21:41

## 2017-09-12 RX ADMIN — FENTANYL CITRATE 25 MICROGRAM(S): 50 INJECTION INTRAVENOUS at 12:07

## 2017-09-12 RX ADMIN — Medication 30 MILLIGRAM(S): at 17:33

## 2017-09-12 RX ADMIN — ONDANSETRON 4 MILLIGRAM(S): 8 TABLET, FILM COATED ORAL at 11:53

## 2017-09-12 RX ADMIN — SODIUM CHLORIDE 3 MILLILITER(S): 9 INJECTION INTRAMUSCULAR; INTRAVENOUS; SUBCUTANEOUS at 07:09

## 2017-09-12 RX ADMIN — Medication 30 MILLIGRAM(S): at 21:37

## 2017-09-12 RX ADMIN — FENTANYL CITRATE 25 MICROGRAM(S): 50 INJECTION INTRAVENOUS at 12:41

## 2017-09-12 RX ADMIN — Medication 30 MILLIGRAM(S): at 15:40

## 2017-09-12 RX ADMIN — Medication 400 MILLIGRAM(S): at 19:49

## 2017-09-12 RX ADMIN — SODIUM CHLORIDE 250 MILLILITER(S): 9 INJECTION, SOLUTION INTRAVENOUS at 17:21

## 2017-09-12 RX ADMIN — HEPARIN SODIUM 5000 UNIT(S): 5000 INJECTION INTRAVENOUS; SUBCUTANEOUS at 15:40

## 2017-09-12 NOTE — BRIEF OPERATIVE NOTE - POST-OP DX
Morbid obesity due to excess calories  09/12/2017    Active  Adelia Sprague Morbid obesity due to excess calories  09/12/2017    Active  Adelia Sprague  Obstructive sleep apnea on CPAP  09/12/2017    Active  Adelia Sprague

## 2017-09-12 NOTE — PHARMACY COMMUNICATION NOTE - COMMENTS
Patient medication reconciliation done. Patient currently taking: allopurinol 300mg tab by mouth daily at noon, amlodipine-benazapril 10-40mg by mouth daily at noon and vitamin D3 1000 units by mouth at noon. Patient was re-educated to take daily multi-vitamins post surgically. Patient re-educated on NSAID avoidance (Ibuprofen, ASA, naproxen, aleve) as they increased risk of GI bleeding. Patient educated to use APAP for mild pain otherwise contact prescriber for consult. Patient was instructed to crush allopurinol, amlodipine, and vitamin D3 and take as directed.

## 2017-09-12 NOTE — BRIEF OPERATIVE NOTE - PROCEDURE
Gastrectomy, sleeve, laparoscopic  09/12/2017    Active  ASAF Gastrectomy, sleeve, laparoscopic  09/12/2017    Active  Adelia Sprague

## 2017-09-12 NOTE — PATIENT PROFILE ADULT. - PSH
H/O spinal fusion  cervical  x2 :  c3-c5  ( front )  and c2-c7  ( back  )  S/P appendectomy  7/27/17

## 2017-09-12 NOTE — BRIEF OPERATIVE NOTE - OPERATION/FINDINGS
Sleeve gastrectomy performed over 36-Fr calibration tube. Negative leak test. Staple line reinforced with sutures and Evicel.

## 2017-09-12 NOTE — PROGRESS NOTE ADULT - SUBJECTIVE AND OBJECTIVE BOX
Bariatric Surgery POST OP Note    Post Op Day#: 0    Subjective:     No acute issues   Pain controlled with medications  Nausea with 2 episodes of small emesis, controlled with anti-emetics   Voiding - 1000cc with Karan Kwan CP and SHARI      Procedure:  Gastrectomy, sleeve, laparoscopic      Vital Signs Last 24 Hrs  T(C): 36.5 (12 Sep 2017 15:30), Max: 37 (12 Sep 2017 07:10)  T(F): 97.7 (12 Sep 2017 15:30), Max: 98.6 (12 Sep 2017 07:10)  HR: 70 (12 Sep 2017 16:00) (68 - 88)  BP: 102/59 (12 Sep 2017 16:00) (102/59 - 161/87)  BP(mean): 77 (12 Sep 2017 16:00) (77 - 116)  RR: 17 (12 Sep 2017 16:00) (17 - 18)  SpO2: 95% (12 Sep 2017 16:00) (93% - 97%)  Height (cm): 175.26 (09-12 @ 07:10)  Weight (kg): 117.5 (09-12 @ 07:10)  BMI (kg/m2): 38.3 (09-12 @ 07:10)  BSA (m2): 2.31 (09-12 @ 07:10)  I&O's Summary    12 Sep 2017 07:01  -  12 Sep 2017 16:41  --------------------------------------------------------  IN: 825 mL / OUT: 1000 mL / NET: -175 mL      I&O's Detail    12 Sep 2017 07:01  -  12 Sep 2017 16:41  --------------------------------------------------------  IN:    IV PiggyBack: 375 mL    lactated ringers.: 450 mL  Total IN: 825 mL    OUT:    Indwelling Catheter - Urethral: 1000 mL  Total OUT: 1000 mL    Total NET: -175 mL          Physical Exam:    General:  Appears stated age, well-groomed, well-nourished, no distress  Chest:  clear breath sounds  Cardiovascular:  Regular rate & rhythm  Abdomen:  Soft, incisions clean, dry intact, tenderness around incisions, no rebound or guarding  Skin:  No rash  Neuro/Psych:  Alert, oriented to time, place and person                           14.5   6.9   )-----------( 216      ( 12 Sep 2017 11:40 )             44.9       09-12    137  |  99  |  10  ----------------------------<  154<H>  3.5   |  23  |  0.69    Ca    8.8      12 Sep 2017 11:40  Phos  1.9     09-12  Mg     2.3     09-12        vancomycin  IVPB milliGRAM(s) IV Intermittent once  lidocaine 1% Injectable milliLiter(s) Local Injection once PRN  heparin  Injectable Unit(s) SubCutaneous every 8 hours  lactated ringers. milliLiter(s) IV Continuous <Continuous>  ondansetron Injectable milliGRAM(s) IV Push every 6 hours  aluminum hydroxide/magnesium hydroxide/simethicone Suspension milliLiter(s) Oral every 4 hours PRN  pantoprazole  Injectable milliGRAM(s) IV Push daily  hyoscyamine SL milliGRAM(s) SubLingual every 6 hours  acetaminophen  IVPB. milliGRAM(s) IV Intermittent once  acetaminophen  IVPB. milliGRAM(s) IV Intermittent once  acetaminophen  IVPB. milliGRAM(s) IV Intermittent once  acetaminophen  IVPB. milliGRAM(s) IV Intermittent once  hydrALAZINE Injectable milliGRAM(s) IV Push every 6 hours PRN  multivitamin/thiamine/folic acid in sodium chloride 0.9% milliLiter(s) IV Continuous <Continuous>  metoclopramide Injectable milliGRAM(s) IV Push once PRN  HYDROmorphone  Injectable milliGRAM(s) IV Push every 10 minutes PRN  potassium phosphate IVPB milliMole(s) IV Intermittent once          Assessment and Plan:  56y y/o Male s/p Gastrectomy, sleeve, laparoscopic  , POD # 0    - Start PO liquid oxycodone PRN for pain  - Continue ambulation   - Encourage Incentive Spirometer use  - Bariartic clears   - Continue chemical and mechanical DVT prophylaxis  - Discharge home once tolerating adequate PO and 8 point discharge criteria met

## 2017-09-13 ENCOUNTER — TRANSCRIPTION ENCOUNTER (OUTPATIENT)
Age: 57
End: 2017-09-13

## 2017-09-13 VITALS
OXYGEN SATURATION: 95 % | HEART RATE: 89 BPM | RESPIRATION RATE: 18 BRPM | DIASTOLIC BLOOD PRESSURE: 86 MMHG | TEMPERATURE: 99 F | SYSTOLIC BLOOD PRESSURE: 138 MMHG

## 2017-09-13 LAB
ANION GAP SERPL CALC-SCNC: 17 MMOL/L — SIGNIFICANT CHANGE UP (ref 5–17)
BASOPHILS # BLD AUTO: 0 K/UL — SIGNIFICANT CHANGE UP (ref 0–0.2)
BASOPHILS NFR BLD AUTO: 0.1 % — SIGNIFICANT CHANGE UP (ref 0–2)
BUN SERPL-MCNC: 7 MG/DL — SIGNIFICANT CHANGE UP (ref 7–23)
CALCIUM SERPL-MCNC: 9.2 MG/DL — SIGNIFICANT CHANGE UP (ref 8.4–10.5)
CHLORIDE SERPL-SCNC: 99 MMOL/L — SIGNIFICANT CHANGE UP (ref 96–108)
CO2 SERPL-SCNC: 23 MMOL/L — SIGNIFICANT CHANGE UP (ref 22–31)
CREAT SERPL-MCNC: 0.82 MG/DL — SIGNIFICANT CHANGE UP (ref 0.5–1.3)
EOSINOPHIL # BLD AUTO: 0 K/UL — SIGNIFICANT CHANGE UP (ref 0–0.5)
EOSINOPHIL NFR BLD AUTO: 0.1 % — SIGNIFICANT CHANGE UP (ref 0–6)
GLUCOSE SERPL-MCNC: 107 MG/DL — HIGH (ref 70–99)
HCT VFR BLD CALC: 41.6 % — SIGNIFICANT CHANGE UP (ref 39–50)
HGB BLD-MCNC: 13.6 G/DL — SIGNIFICANT CHANGE UP (ref 13–17)
LYMPHOCYTES # BLD AUTO: 2.5 K/UL — SIGNIFICANT CHANGE UP (ref 1–3.3)
LYMPHOCYTES # BLD AUTO: 24.8 % — SIGNIFICANT CHANGE UP (ref 13–44)
MAGNESIUM SERPL-MCNC: 2 MG/DL — SIGNIFICANT CHANGE UP (ref 1.6–2.6)
MCHC RBC-ENTMCNC: 25.8 PG — LOW (ref 27–34)
MCHC RBC-ENTMCNC: 32.7 GM/DL — SIGNIFICANT CHANGE UP (ref 32–36)
MCV RBC AUTO: 79 FL — LOW (ref 80–100)
MONOCYTES # BLD AUTO: 0.8 K/UL — SIGNIFICANT CHANGE UP (ref 0–0.9)
MONOCYTES NFR BLD AUTO: 7.5 % — SIGNIFICANT CHANGE UP (ref 2–14)
NEUTROPHILS # BLD AUTO: 6.8 K/UL — SIGNIFICANT CHANGE UP (ref 1.8–7.4)
NEUTROPHILS NFR BLD AUTO: 67.5 % — SIGNIFICANT CHANGE UP (ref 43–77)
PHOSPHATE SERPL-MCNC: 3.2 MG/DL — SIGNIFICANT CHANGE UP (ref 2.5–4.5)
PLATELET # BLD AUTO: 268 K/UL — SIGNIFICANT CHANGE UP (ref 150–400)
POTASSIUM SERPL-MCNC: 3.8 MMOL/L — SIGNIFICANT CHANGE UP (ref 3.5–5.3)
POTASSIUM SERPL-SCNC: 3.8 MMOL/L — SIGNIFICANT CHANGE UP (ref 3.5–5.3)
RBC # BLD: 5.26 M/UL — SIGNIFICANT CHANGE UP (ref 4.2–5.8)
RBC # FLD: 13.7 % — SIGNIFICANT CHANGE UP (ref 10.3–14.5)
SODIUM SERPL-SCNC: 139 MMOL/L — SIGNIFICANT CHANGE UP (ref 135–145)
WBC # BLD: 10 K/UL — SIGNIFICANT CHANGE UP (ref 3.8–10.5)
WBC # FLD AUTO: 10 K/UL — SIGNIFICANT CHANGE UP (ref 3.8–10.5)

## 2017-09-13 PROCEDURE — 86901 BLOOD TYPING SEROLOGIC RH(D): CPT

## 2017-09-13 PROCEDURE — 93005 ELECTROCARDIOGRAM TRACING: CPT

## 2017-09-13 PROCEDURE — 88305 TISSUE EXAM BY PATHOLOGIST: CPT

## 2017-09-13 PROCEDURE — 83735 ASSAY OF MAGNESIUM: CPT

## 2017-09-13 PROCEDURE — 85027 COMPLETE CBC AUTOMATED: CPT

## 2017-09-13 PROCEDURE — 84100 ASSAY OF PHOSPHORUS: CPT

## 2017-09-13 PROCEDURE — 80048 BASIC METABOLIC PNL TOTAL CA: CPT

## 2017-09-13 PROCEDURE — C1889: CPT

## 2017-09-13 PROCEDURE — 86900 BLOOD TYPING SEROLOGIC ABO: CPT

## 2017-09-13 RX ORDER — ONDANSETRON 8 MG/1
1 TABLET, FILM COATED ORAL
Qty: 21 | Refills: 0
Start: 2017-09-13 | End: 2017-09-20

## 2017-09-13 RX ORDER — OMEPRAZOLE 10 MG/1
1 CAPSULE, DELAYED RELEASE ORAL
Qty: 30 | Refills: 0
Start: 2017-09-13 | End: 2017-10-13

## 2017-09-13 RX ORDER — OXYCODONE HYDROCHLORIDE 5 MG/1
5 TABLET ORAL
Qty: 120 | Refills: 0
Start: 2017-09-13 | End: 2017-09-17

## 2017-09-13 RX ORDER — OXYCODONE HYDROCHLORIDE 5 MG/1
5 TABLET ORAL EVERY 4 HOURS
Qty: 0 | Refills: 0 | Status: DISCONTINUED | OUTPATIENT
Start: 2017-09-13 | End: 2017-09-13

## 2017-09-13 RX ORDER — CHOLECALCIFEROL (VITAMIN D3) 125 MCG
1 CAPSULE ORAL
Qty: 0 | Refills: 0 | COMMUNITY

## 2017-09-13 RX ORDER — HYOSCYAMINE SULFATE 0.13 MG
1 TABLET ORAL
Qty: 28 | Refills: 0
Start: 2017-09-13 | End: 2017-09-20

## 2017-09-13 RX ADMIN — ONDANSETRON 4 MILLIGRAM(S): 8 TABLET, FILM COATED ORAL at 17:39

## 2017-09-13 RX ADMIN — Medication 250 MILLIGRAM(S): at 08:13

## 2017-09-13 RX ADMIN — Medication 0.12 MILLIGRAM(S): at 01:55

## 2017-09-13 RX ADMIN — HEPARIN SODIUM 5000 UNIT(S): 5000 INJECTION INTRAVENOUS; SUBCUTANEOUS at 05:46

## 2017-09-13 RX ADMIN — Medication 0.12 MILLIGRAM(S): at 08:13

## 2017-09-13 RX ADMIN — OXYCODONE HYDROCHLORIDE 5 MILLIGRAM(S): 5 TABLET ORAL at 18:09

## 2017-09-13 RX ADMIN — PANTOPRAZOLE SODIUM 40 MILLIGRAM(S): 20 TABLET, DELAYED RELEASE ORAL at 11:43

## 2017-09-13 RX ADMIN — Medication 0.12 MILLIGRAM(S): at 13:38

## 2017-09-13 RX ADMIN — ONDANSETRON 4 MILLIGRAM(S): 8 TABLET, FILM COATED ORAL at 01:55

## 2017-09-13 RX ADMIN — Medication 1000 MILLIGRAM(S): at 02:26

## 2017-09-13 RX ADMIN — ONDANSETRON 4 MILLIGRAM(S): 8 TABLET, FILM COATED ORAL at 05:46

## 2017-09-13 RX ADMIN — HEPARIN SODIUM 5000 UNIT(S): 5000 INJECTION INTRAVENOUS; SUBCUTANEOUS at 13:38

## 2017-09-13 RX ADMIN — OXYCODONE HYDROCHLORIDE 5 MILLIGRAM(S): 5 TABLET ORAL at 17:39

## 2017-09-13 RX ADMIN — Medication 0.12 MILLIGRAM(S): at 17:40

## 2017-09-13 RX ADMIN — ONDANSETRON 4 MILLIGRAM(S): 8 TABLET, FILM COATED ORAL at 11:43

## 2017-09-13 RX ADMIN — Medication 400 MILLIGRAM(S): at 01:56

## 2017-09-13 NOTE — DISCHARGE NOTE ADULT - PATIENT PORTAL LINK FT
“You can access the FollowHealth Patient Portal, offered by [facility], by registering with the following website: [email or web address]”

## 2017-09-13 NOTE — DIETITIAN INITIAL EVALUATION ADULT. - OTHER INFO
Consult received for bariatric surgery. Per chart pt has tried multiple wt loss attempts. Per outpatient RD note on 3/9/17 pt has tried to lose wt under RD supervision with a structured nutrition plan and lost 4 pounds since December, pt weighed 264 pounds a that time. Pt reports weighing 270 pounds before going on the 2 week full liquid diet and presurgical wt was 259 pounds. Pt now S/P laparoscopic vertical sleeve gastrectomy. Pt denies nausea/vomiting, sipping clear liquid bariatric diet at time of visit. Pt with knowledge of bariatric full liquid diet however receptive to in depth review/reinforcement. Pt states having some protein shakes at home and plans to purchase more. Pt states he also purchased some of the necessary vitamins. Pt was advised to purchase chewable/liquid multivitamin with added elemental iron, chewable/liquid calcium citrate with vitamin D and sublingual B12. Pt was advised to take the chewable/liquid multivitamin with added elemental iron at least 2 hours apart from the chewable/liquid calcium citrate with vitamin D for optimal absorption. Pt states he has a follow up appointment scheduled with outpatient RD. Pt able to teach back all points discussed during interview.

## 2017-09-13 NOTE — DISCHARGE NOTE ADULT - HOSPITAL COURSE
On September 12, 2017 Mr Combs underwent laparoscopic Sleeve Gastrectomy for Morbid Obesity BMI 38. He received SSI and DVT prior to start of surgery. Indwelling mathew place following intubation. Procedure went as planned and he was extubated and taken to the recovery room. Once hemodynamically stable, had effective pain control, he ambulated with assistance.  Mathew removed and spontaneous return of bladder function, Later that afternoon, he was placed on bariatric clear liquid.    On POD#1, he had orthostatic hypotension and treated with IV fluid bolus 500ml. He subsequently returned to baseline with no further adverse events. He is ambulating independently, tolerating increase oral intake. Satisfied with pain control. He was cleared for discharge home. Bariatric procedure specific discharge instructions explained, including sign and symptoms of postop complication, VTE prevention. Written material given. Bariatric medications reviewed and obtained at [facility] pharmacy. Pt advise to continue protocol derived stage-diet progression supervised by DAREK in outpatient setting. he ill also follow up with Dr Rodriguez in 7-10 days as well as dietitian in 30 days.

## 2017-09-13 NOTE — PROGRESS NOTE ADULT - ASSESSMENT
56y y/o Male s/p Gastrectomy, sleeve, laparoscopic  , POD # 1    - Start PO liquid oxycodone PRN for pain  - Continue ambulation   - Encourage Incentive Spirometer use  - Bariartic clears   - Continue chemical and mechanical DVT prophylaxis  - Discharge home once tolerating adequate PO and 8 point discharge criteria met

## 2017-09-13 NOTE — DISCHARGE NOTE ADULT - ADDITIONAL INSTRUCTIONS
additional instructions as explained and outlined on the written procedure specific gastric sleeve instructions

## 2017-09-13 NOTE — DISCHARGE NOTE ADULT - MEDICATION SUMMARY - MEDICATIONS TO STOP TAKING
I will STOP taking the medications listed below when I get home from the hospital:    Vitamin D3 1000 intl units oral capsule  -- 1 cap(s) by mouth once a day 12 noon

## 2017-09-13 NOTE — PROGRESS NOTE ADULT - SUBJECTIVE AND OBJECTIVE BOX
Post Op Day#: 1    Subjective: Feeling better than yesterday, improve abdominal pain, lightheadedness resolved.     Objective: Denies nausea, no vomiting. Was able to get OOB and ambulated with assistance around the unit w/o c/o of dizziness. Steady gait. Afebrile, v/s stable. Drinking bariatric liquid. Voiding following removal of mathew catheter. Denies nausea and vomiting Voided . labs within acceptable range.                                            Vital Signs Last 24 Hrs  T(C): 36.9 (13 Sep 2017 08:34), Max: 37.1 (13 Sep 2017 01:12)  T(F): 98.4 (13 Sep 2017 08:34), Max: 98.7 (13 Sep 2017 01:12)  HR: 75 (13 Sep 2017 08:34) (58 - 89)  BP: 125/82 (13 Sep 2017 08:34) (102/59 - 161/87)  BP(mean): 87 (12 Sep 2017 17:00) (77 - 116)  RR: 18 (13 Sep 2017 08:34) (17 - 18)  SpO2: 95% (13 Sep 2017 08:34) (93% - 98%)                                               I&O's Summary    12 Sep 2017 07:01  -  13 Sep 2017 07:00  --------------------------------------------------------  IN: 3842 mL / OUT: 2500 mL / NET: 1342 mL    13 Sep 2017 07:01  -  13 Sep 2017 08:38  --------------------------------------------------------  IN: 250 mL / OUT: 650 mL / NET: -400 mL    ORAL:                                                              13.6   10.0  )-----------( 268      ( 13 Sep 2017 06:59 )             41.6                                               09-13    139  |  99  |  7   ----------------------------<  107<H>  3.8   |  23  |  0.82    Ca    9.2      13 Sep 2017 06:59  Phos  3.2     09-13  Mg     2.0     09-13    Physical Exam:         Lungs:  clear breath sounds b/l       Heart:  Regular rate & rhythm       Abdomen:  Soft, non-distended.  Scopes sites clean, dry and intact. + bs, - flatus, no rebound or guarding       Skin:  No rash       Extremities: + pulses, no edema, no calf tenderness, negative tonya's                Caprini VTE Risk Score:  (   ) No extended prophylaxis required post-discharge  [facility] VTE RISK SCORE:   () No extended prophylaxis required post-discharge    Assessment and Plan: S/p LAP SLEEVE GASTRECTOMY POD#1, Stable  -   Start Bariatric Clear diet this morning. Fluid and Protein tracking sheet explained and given   -   Advance to Protocol-derived staged diet progression supervised by RD in outpatient setting  -   Continue DVT/ GI prophylaxis this am, Incentive spirometry  -   Ambulation at least every 3 hours as tolerated  -   Procedure specific education including diet, vitamins and VTE prevention. Written materials given  -   Medication reviewed and reconciled, Bariatric meds obtained from [facility]  -    D/C home once Bariatric 8-Point d/c criteria met  -  Follow up with Dr Ayers 7-10 days, Dietitian and PMD in 30 days.      Mai Galicia, JACKI, ANP  [phone] Post Op Day#: 1    Subjective: Feeling better than yesterday, improve abdominal pain, lightheadedness resolved.     Objective: Denies nausea, no vomiting. Was able to get OOB and ambulated with assistance around the unit w/o c/o of dizziness. Steady gait. Afebrile, v/s stable. Drinking bariatric liquid. Voiding following removal of mathew catheter. Denies nausea and vomiting Voided . labs within acceptable range.                                            Vital Signs Last 24 Hrs  T(C): 36.9 (13 Sep 2017 08:34), Max: 37.1 (13 Sep 2017 01:12)  T(F): 98.4 (13 Sep 2017 08:34), Max: 98.7 (13 Sep 2017 01:12)  HR: 75 (13 Sep 2017 08:34) (58 - 89)  BP: 125/82 (13 Sep 2017 08:34) (102/59 - 161/87)  BP(mean): 87 (12 Sep 2017 17:00) (77 - 116)  RR: 18 (13 Sep 2017 08:34) (17 - 18)  SpO2: 95% (13 Sep 2017 08:34) (93% - 98%)                                               I&O's Summary    12 Sep 2017 07:01  -  13 Sep 2017 07:00  --------------------------------------------------------  IN: 3842 mL / OUT: 2500 mL / NET: 1342 mL    13 Sep 2017 07:01  -  13 Sep 2017 08:38  --------------------------------------------------------  IN: 250 mL / OUT: 650 mL / NET: -400 mL    ORAL:                                                              13.6   10.0  )-----------( 268      ( 13 Sep 2017 06:59 )             41.6                                               09-13    139  |  99  |  7   ----------------------------<  107<H>  3.8   |  23  |  0.82    Ca    9.2      13 Sep 2017 06:59  Phos  3.2     09-13  Mg     2.0     09-13    Physical Exam:         Lungs:  clear breath sounds b/l       Heart:  Regular rate & rhythm       Abdomen:  Soft, non-distended.  Scopes sites clean, dry and intact. + bs, - flatus, no rebound or guarding       Skin:  No rash       Extremities: + pulses, no edema, no calf tenderness, negative tonya's                Caprini VTE Risk Score: 3-4  (Moderate) No extended prophylaxis recommended post-discharge  [facility] VTE RISK SCORE: 11 (Low) No extended prophylaxis recommended post-discharge    Assessment and Plan: S/p LAP SLEEVE GASTRECTOMY POD#1, Stable  -   Start Bariatric Clear diet this morning. Fluid and Protein tracking sheet explained and given   -   Advance to Protocol-derived staged diet progression supervised by RD in outpatient setting  -   Continue DVT/ GI prophylaxis this am, Incentive spirometry  -   Ambulation at least every 3 hours as tolerated  -   Procedure specific education including diet, vitamins and VTE prevention. Written materials given  -   Medication reviewed and reconciled, Bariatric meds obtained from [facility]  -    D/C home once Bariatric 8-Point d/c criteria met  -  Follow up with Dr Rodriguez in 7-10 days, Dietitian and PMD in 30 days.      Mai Galicia, JACKI, ANP  [phone]

## 2017-09-13 NOTE — DISCHARGE NOTE ADULT - CARE PLAN
Principal Discharge DX:	Morbid obesity  Goal:	Patient will adapt healthy lifestyle changes for improve quality of life.  Instructions for follow-up, activity and diet:	call office [phone] and conform office appointment. No strenous exercise until clear by your surgeon.   Monitor incision sites for redness, increase pain, and drainage, Notify your surgeon's office if these are noted.  If you experience calf-pain swelling, redness, chest pain, shortness of breath visit your nearest emergency room.    if you have fever, chills, nausea, vomiting, generalized weakness, restless, shoulder pain, notify your surgeon's office immediately.

## 2017-09-13 NOTE — PROVIDER CONTACT NOTE (OTHER) - ACTION/TREATMENT ORDERED:
Dr. Birmingham ordered stat labs, IVF bolus, and EKG. After bolus pt's B/p stable and pt felt better

## 2017-09-13 NOTE — DIETITIAN INITIAL EVALUATION ADULT. - ENERGY NEEDS
Ht: 69“, Wt: 259lbs- presurgical, BMI: 38.3kg/m2, IBW: 160 lbs (+/-10%)  no edema or pressure injuries

## 2017-09-13 NOTE — PROGRESS NOTE ADULT - ATTENDING COMMENTS
I saw and examined the patient   Agree with note which was also reviewed and edited where appropriate.  D/W patient, RN, residents and Fellow    Needs fluids, o/w ok.

## 2017-09-13 NOTE — DISCHARGE NOTE ADULT - NS AS DC STROKE ED MATERIALS
Stroke Education Booklet/Prescribed Medications/Need for Followup After Discharge/Stroke Warning Signs and Symptoms/Call 911 for Stroke/Risk Factors for Stroke

## 2017-09-13 NOTE — DISCHARGE NOTE ADULT - MEDICATION SUMMARY - MEDICATIONS TO TAKE
I will START or STAY ON the medications listed below when I get home from the hospital:    oxyCODONE 5 mg/5 mL oral solution  -- 5 milliliter(s) by mouth every 4 hours MDD:30  -- Caution federal law prohibits the transfer of this drug to any person other  than the person for whom it was prescribed.  May cause drowsiness.  Alcohol may intensify this effect.  Use care when operating dangerous machinery.  This prescription cannot be refilled.  Using more of this medication than prescribed may cause serious breathing problems.    -- Indication: For pain    Zofran ODT 4 mg oral tablet, disintegrating  -- 1 tab(s) by mouth 3 times a day MDD:3  -- Indication: For nausea    allopurinol 300 mg oral tablet  -- 1 tab(s) by mouth once a day 12 noon   -- Indication: For gout    amlodipine-benazepril 10 mg-40 mg oral capsule  -- 1 cap(s) by mouth once a day 12 noon - open capsule and mix with applesauce  -- Indication: For Hypertension    hyoscyamine 0.125 mg sublingual tablet  -- 1 tab(s) under tongue every 6 hours as needed MDD:4  -- May cause drowsiness.  Alcohol may intensify this effect.  Use care when operating dangerous machinery.    -- Indication: For gastric spasm    omeprazole 40 mg oral delayed release capsule  -- 1 cap(s) by mouth once a day MDD:1. do no tswallow whole, open capsule and mix in unsweetneed applesauce  -- do not swallow whole,mix content in applesauce  -- Indication: For reflux

## 2017-09-13 NOTE — DIETITIAN INITIAL EVALUATION ADULT. - ADHERENCE
Pt reports following a full liquid diet for 2 weeks PTA as instructed by outpatient RD. Pt reports having premier protein shakes, soups, sugar free jello or pudding and yogurt all based on approved list by outpatient RD.

## 2017-09-13 NOTE — DIETITIAN INITIAL EVALUATION ADULT. - PERTINENT MEDS FT
lactated ringers, aluminum hydroxide/magnesium hydroxide/simethicone, Levsin, Reglan, Zofran, Protonix

## 2017-09-13 NOTE — PROVIDER CONTACT NOTE (OTHER) - SITUATION
Chest redness noted.
Pt's b/p dropped when attempted to get OOB down to 71/53, pt became lightheaded and dizzy and started feeling like he was going to pass out

## 2017-09-13 NOTE — CHART NOTE - NSCHARTNOTEFT_GEN_A_CORE
At approximately 9pm, nurse reported that patient SBP laying down was 131/83 and 112/73 sitting up. When patient stood up to urinate SBP dropped to 71/53 with  and began to fall. Nurse caught him and laid him back to bed. He reports that he felt better after laying down and BP was 113/70, HR 58. Nurse alerted MD and patient was given 500cc LR bolus and EKG, and CBC/BMP were ordered. Patient denied lightheadedness, CP, SOB, or palpitations. EKG showed normal sinus rhythm and H/H was stable at 13.1/39.6. BP now stable at 120s

## 2017-09-13 NOTE — PROGRESS NOTE ADULT - SUBJECTIVE AND OBJECTIVE BOX
[facility]GERY PROGRESS NOTE    POST OPERATIVE DAY #: 1      SUBJECTIVE: Pt seen    Pain /SOB/Nausea/Vomiting/Flatus/BM/Void/Ambulation    Vital Signs Last 24 Hrs  T(C): 36.8 (13 Sep 2017 05:01), Max: 37.1 (13 Sep 2017 01:12)  T(F): 98.2 (13 Sep 2017 05:01), Max: 98.7 (13 Sep 2017 01:12)  HR: 64 (13 Sep 2017 05:01) (58 - 89)  BP: 116/73 (13 Sep 2017 05:01) (102/59 - 161/87)  BP(mean): 87 (12 Sep 2017 17:00) (77 - 116)  RR: 18 (13 Sep 2017 05:01) (17 - 18)  SpO2: 96% (13 Sep 2017 05:01) (93% - 98%)    Physical Exam  General: awake, alert,    Pulm: respirations unlabored, no increased WOB  Abdomen: soft, mildly distended, NT, incision c/d/i  Extremities: Grossly symmetric    I&O's Summary    12 Sep 2017 07:01  -  13 Sep 2017 05:14  --------------------------------------------------------  IN: 2888 mL / OUT: 2000 mL / NET: 888 mL      I&O's Detail    12 Sep 2017 07:01  -  13 Sep 2017 05:14  --------------------------------------------------------  IN:    IV PiggyBack: 575 mL    Lactated Ringers IV Bolus: 500 mL    lactated ringers.: 1383 mL    multivitamin/thiamine/folic acid in sodium chloride 0.9%: 250 mL    Oral Fluid: 180 mL  Total IN: 2888 mL    OUT:    Indwelling Catheter - Urethral: 1250 mL    Voided: 750 mL  Total OUT: 2000 mL    Total NET: 888 mL          MEDICATIONS  (STANDING):  vancomycin  IVPB 1500 milliGRAM(s) IV Intermittent once  heparin  Injectable 5000 Unit(s) SubCutaneous every 8 hours  lactated ringers. 1000 milliLiter(s) (150 mL/Hr) IV Continuous <Continuous>  vancomycin  IVPB 1000 milliGRAM(s) IV Intermittent once  ondansetron Injectable 4 milliGRAM(s) IV Push every 6 hours  pantoprazole  Injectable 40 milliGRAM(s) IV Push daily  hyoscyamine SL 0.125 milliGRAM(s) SubLingual every 6 hours  acetaminophen  IVPB. 1000 milliGRAM(s) IV Intermittent once    MEDICATIONS  (PRN):  lidocaine 1% Injectable 0.2 milliLiter(s) Local Injection once PRN prior IV insertion  aluminum hydroxide/magnesium hydroxide/simethicone Suspension 30 milliLiter(s) Oral every 4 hours PRN Dyspepsia  hydrALAZINE Injectable 10 milliGRAM(s) IV Push every 6 hours PRN SBP>180  metoclopramide Injectable 10 milliGRAM(s) IV Push once PRN nausea  HYDROmorphone  Injectable 0.25 milliGRAM(s) IV Push every 10 minutes PRN Moderate Pain (4 - 6)      LABS:                        13.1   7.2   )-----------( 234      ( 12 Sep 2017 22:18 )             39.6     09-12    139  |  100  |  7   ----------------------------<  141<H>  4.1   |  21<L>  |  0.66    Ca    8.3<L>      12 Sep 2017 22:18  Phos  3.1     09-12  Mg     2.3     09-12            RADIOLOGY & ADDITIONAL STUDIES: [facility] SURGERY PROGRESS NOTE    POST OPERATIVE DAY #: 1      SUBJECTIVE: Pt seen. NAD, comfortable in bed. No Pain /SOB/Nausea/Vomiting.    Vital Signs Last 24 Hrs  T(C): 36.8 (13 Sep 2017 05:01), Max: 37.1 (13 Sep 2017 01:12)  T(F): 98.2 (13 Sep 2017 05:01), Max: 98.7 (13 Sep 2017 01:12)  HR: 64 (13 Sep 2017 05:01) (58 - 89)  BP: 116/73 (13 Sep 2017 05:01) (102/59 - 161/87)  BP(mean): 87 (12 Sep 2017 17:00) (77 - 116)  RR: 18 (13 Sep 2017 05:01) (17 - 18)  SpO2: 96% (13 Sep 2017 05:01) (93% - 98%)    Physical Exam  General: awake, alert,    Pulm: respirations unlabored, no increased WOB  Abdomen: soft, mildly distended, NT, incision c/d/i  Extremities: Grossly symmetric    I&O's Summary    12 Sep 2017 07:01  -  13 Sep 2017 05:14  --------------------------------------------------------  IN: 2888 mL / OUT: 2000 mL / NET: 888 mL      I&O's Detail    12 Sep 2017 07:01  -  13 Sep 2017 05:14  --------------------------------------------------------  IN:    IV PiggyBack: 575 mL    Lactated Ringers IV Bolus: 500 mL    lactated ringers.: 1383 mL    multivitamin/thiamine/folic acid in sodium chloride 0.9%: 250 mL    Oral Fluid: 180 mL  Total IN: 2888 mL    OUT:    Indwelling Catheter - Urethral: 1250 mL    Voided: 750 mL  Total OUT: 2000 mL    Total NET: 888 mL          MEDICATIONS  (STANDING):  vancomycin  IVPB 1500 milliGRAM(s) IV Intermittent once  heparin  Injectable 5000 Unit(s) SubCutaneous every 8 hours  lactated ringers. 1000 milliLiter(s) (150 mL/Hr) IV Continuous <Continuous>  vancomycin  IVPB 1000 milliGRAM(s) IV Intermittent once  ondansetron Injectable 4 milliGRAM(s) IV Push every 6 hours  pantoprazole  Injectable 40 milliGRAM(s) IV Push daily  hyoscyamine SL 0.125 milliGRAM(s) SubLingual every 6 hours  acetaminophen  IVPB. 1000 milliGRAM(s) IV Intermittent once    MEDICATIONS  (PRN):  lidocaine 1% Injectable 0.2 milliLiter(s) Local Injection once PRN prior IV insertion  aluminum hydroxide/magnesium hydroxide/simethicone Suspension 30 milliLiter(s) Oral every 4 hours PRN Dyspepsia  hydrALAZINE Injectable 10 milliGRAM(s) IV Push every 6 hours PRN SBP>180  metoclopramide Injectable 10 milliGRAM(s) IV Push once PRN nausea  HYDROmorphone  Injectable 0.25 milliGRAM(s) IV Push every 10 minutes PRN Moderate Pain (4 - 6)      LABS:                        13.1   7.2   )-----------( 234      ( 12 Sep 2017 22:18 )             39.6     09-12    139  |  100  |  7   ----------------------------<  141<H>  4.1   |  21<L>  |  0.66    Ca    8.3<L>      12 Sep 2017 22:18  Phos  3.1     09-12  Mg     2.3     09-12            RADIOLOGY & ADDITIONAL STUDIES:

## 2017-09-13 NOTE — DISCHARGE NOTE ADULT - INSTRUCTIONS
Bariatric Clear liquid today, advance to full liquid diet tomorrow for a period of 4 weeks. No carbonated beverages. Follow up with your dietitian in 30 days.

## 2017-09-13 NOTE — DISCHARGE NOTE ADULT - PLAN OF CARE
Patient will adapt healthy lifestyle changes for improve quality of life. call office [phone] and conform office appointment. No strenous exercise until clear by your surgeon.   Monitor incision sites for redness, increase pain, and drainage, Notify your surgeon's office if these are noted.  If you experience calf-pain swelling, redness, chest pain, shortness of breath visit your nearest emergency room.    if you have fever, chills, nausea, vomiting, generalized weakness, restless, shoulder pain, notify your surgeon's office immediately.

## 2017-09-15 LAB — SURGICAL PATHOLOGY STUDY: SIGNIFICANT CHANGE UP

## 2017-09-22 ENCOUNTER — APPOINTMENT (OUTPATIENT)
Dept: SURGERY | Facility: CLINIC | Age: 57
End: 2017-09-22
Payer: COMMERCIAL

## 2017-09-22 VITALS
RESPIRATION RATE: 14 BRPM | BODY MASS INDEX: 35.55 KG/M2 | OXYGEN SATURATION: 98 % | WEIGHT: 240 LBS | HEIGHT: 69 IN | TEMPERATURE: 98.2 F | SYSTOLIC BLOOD PRESSURE: 126 MMHG | DIASTOLIC BLOOD PRESSURE: 84 MMHG | HEART RATE: 77 BPM

## 2017-09-22 DIAGNOSIS — Z83.3 FAMILY HISTORY OF DIABETES MELLITUS: ICD-10-CM

## 2017-09-22 PROCEDURE — 99024 POSTOP FOLLOW-UP VISIT: CPT

## 2017-09-22 RX ORDER — OXYCODONE HYDROCHLORIDE 5 MG/5ML
5 SOLUTION ORAL
Qty: 120 | Refills: 0 | Status: DISCONTINUED | COMMUNITY
Start: 2017-09-13

## 2017-09-22 RX ORDER — HYOSCYAMINE SULFATE 0.12 MG/1
0.12 TABLET SUBLINGUAL
Qty: 28 | Refills: 0 | Status: DISCONTINUED | COMMUNITY
Start: 2017-09-13

## 2017-09-22 RX ORDER — AMLODIPINE AND VALSARTAN 10; 320 MG/1; MG/1
10-320 TABLET, FILM COATED ORAL
Qty: 30 | Refills: 0 | Status: ACTIVE | COMMUNITY
Start: 2017-08-28

## 2017-09-22 RX ORDER — SCOPALAMINE 1 MG/3D
1 PATCH, EXTENDED RELEASE TRANSDERMAL
Qty: 8 | Refills: 0 | Status: DISCONTINUED | COMMUNITY
Start: 2017-07-20

## 2017-11-13 ENCOUNTER — APPOINTMENT (OUTPATIENT)
Dept: SURGERY | Facility: CLINIC | Age: 57
End: 2017-11-13
Payer: COMMERCIAL

## 2017-11-13 VITALS
HEART RATE: 62 BPM | OXYGEN SATURATION: 96 % | WEIGHT: 214.5 LBS | RESPIRATION RATE: 15 BRPM | BODY MASS INDEX: 31.77 KG/M2 | TEMPERATURE: 98.5 F | SYSTOLIC BLOOD PRESSURE: 125 MMHG | DIASTOLIC BLOOD PRESSURE: 82 MMHG | HEIGHT: 69 IN

## 2017-11-13 DIAGNOSIS — E66.9 OBESITY, UNSPECIFIED: ICD-10-CM

## 2017-11-13 DIAGNOSIS — Z87.898 PERSONAL HISTORY OF OTHER SPECIFIED CONDITIONS: ICD-10-CM

## 2017-11-13 PROCEDURE — 99024 POSTOP FOLLOW-UP VISIT: CPT

## 2017-12-11 ENCOUNTER — APPOINTMENT (OUTPATIENT)
Dept: SURGERY | Facility: CLINIC | Age: 57
End: 2017-12-11
Payer: COMMERCIAL

## 2017-12-11 VITALS
RESPIRATION RATE: 16 BRPM | DIASTOLIC BLOOD PRESSURE: 78 MMHG | OXYGEN SATURATION: 97 % | SYSTOLIC BLOOD PRESSURE: 123 MMHG | TEMPERATURE: 98.3 F | HEART RATE: 65 BPM | HEIGHT: 69 IN | BODY MASS INDEX: 30.59 KG/M2 | WEIGHT: 206.5 LBS

## 2017-12-11 DIAGNOSIS — Z98.84 BARIATRIC SURGERY STATUS: ICD-10-CM

## 2017-12-11 DIAGNOSIS — E66.9 OBESITY, UNSPECIFIED: ICD-10-CM

## 2017-12-11 PROCEDURE — 99024 POSTOP FOLLOW-UP VISIT: CPT

## 2017-12-11 RX ORDER — OMEPRAZOLE 40 MG/1
40 CAPSULE, DELAYED RELEASE ORAL
Qty: 30 | Refills: 0 | Status: DISCONTINUED | COMMUNITY
Start: 2017-09-13 | End: 2017-12-11

## 2018-03-12 ENCOUNTER — APPOINTMENT (OUTPATIENT)
Dept: SURGERY | Facility: CLINIC | Age: 58
End: 2018-03-12

## 2020-09-26 NOTE — PROVIDER CONTACT NOTE (OTHER) - REASON
Pt's b/p dropped when attempted to get OOB down to 71/53, pt became lightheaded and dizzy and started feeling like he was going to pass out
upper chest redness noted
No

## 2021-04-16 ENCOUNTER — INPATIENT (INPATIENT)
Facility: HOSPITAL | Age: 61
LOS: 2 days | Discharge: HOME | End: 2021-04-19
Attending: STUDENT IN AN ORGANIZED HEALTH CARE EDUCATION/TRAINING PROGRAM | Admitting: STUDENT IN AN ORGANIZED HEALTH CARE EDUCATION/TRAINING PROGRAM
Payer: COMMERCIAL

## 2021-04-16 VITALS
RESPIRATION RATE: 17 BRPM | HEART RATE: 63 BPM | SYSTOLIC BLOOD PRESSURE: 139 MMHG | HEIGHT: 69 IN | TEMPERATURE: 99 F | OXYGEN SATURATION: 100 % | DIASTOLIC BLOOD PRESSURE: 90 MMHG

## 2021-04-16 DIAGNOSIS — I10 ESSENTIAL (PRIMARY) HYPERTENSION: ICD-10-CM

## 2021-04-16 DIAGNOSIS — Z98.1 ARTHRODESIS STATUS: Chronic | ICD-10-CM

## 2021-04-16 DIAGNOSIS — E66.9 OBESITY, UNSPECIFIED: ICD-10-CM

## 2021-04-16 DIAGNOSIS — R10.9 UNSPECIFIED ABDOMINAL PAIN: ICD-10-CM

## 2021-04-16 DIAGNOSIS — E80.6 OTHER DISORDERS OF BILIRUBIN METABOLISM: ICD-10-CM

## 2021-04-16 DIAGNOSIS — Z90.49 ACQUIRED ABSENCE OF OTHER SPECIFIED PARTS OF DIGESTIVE TRACT: Chronic | ICD-10-CM

## 2021-04-16 DIAGNOSIS — Z00.00 ENCOUNTER FOR GENERAL ADULT MEDICAL EXAMINATION WITHOUT ABNORMAL FINDINGS: ICD-10-CM

## 2021-04-16 DIAGNOSIS — R74.01 ELEVATION OF LEVELS OF LIVER TRANSAMINASE LEVELS: ICD-10-CM

## 2021-04-16 DIAGNOSIS — Z02.9 ENCOUNTER FOR ADMINISTRATIVE EXAMINATIONS, UNSPECIFIED: ICD-10-CM
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
## 2021-04-16 LAB
ALBUMIN SERPL ELPH-MCNC: 3.9 G/DL — SIGNIFICANT CHANGE UP (ref 3.3–5)
ALP SERPL-CCNC: 208 U/L — HIGH (ref 40–120)
ALT FLD-CCNC: 150 U/L — HIGH (ref 4–41)
ANION GAP SERPL CALC-SCNC: 12 MMOL/L — SIGNIFICANT CHANGE UP (ref 7–14)
APPEARANCE UR: ABNORMAL
APTT BLD: 40.3 SEC — HIGH (ref 27–36.3)
AST SERPL-CCNC: 110 U/L — HIGH (ref 4–40)
BASOPHILS # BLD AUTO: 0 K/UL — SIGNIFICANT CHANGE UP (ref 0–0.2)
BASOPHILS NFR BLD AUTO: 0 % — SIGNIFICANT CHANGE UP (ref 0–2)
BILIRUB DIRECT SERPL-MCNC: 3.4 MG/DL — HIGH (ref 0–0.2)
BILIRUB SERPL-MCNC: 4.5 MG/DL — HIGH (ref 0.2–1.2)
BILIRUB SERPL-MCNC: 4.5 MG/DL — HIGH (ref 0.2–1.2)
BILIRUB UR-MCNC: ABNORMAL
BLD GP AB SCN SERPL QL: NEGATIVE — SIGNIFICANT CHANGE UP
BLOOD GAS VENOUS COMPREHENSIVE RESULT: SIGNIFICANT CHANGE UP
BUN SERPL-MCNC: 14 MG/DL — SIGNIFICANT CHANGE UP (ref 7–23)
CALCIUM SERPL-MCNC: 9.7 MG/DL — SIGNIFICANT CHANGE UP (ref 8.4–10.5)
CHLORIDE SERPL-SCNC: 100 MMOL/L — SIGNIFICANT CHANGE UP (ref 98–107)
CO2 SERPL-SCNC: 27 MMOL/L — SIGNIFICANT CHANGE UP (ref 22–31)
COLOR SPEC: ABNORMAL
CREAT SERPL-MCNC: 0.83 MG/DL — SIGNIFICANT CHANGE UP (ref 0.5–1.3)
DIFF PNL FLD: NEGATIVE — SIGNIFICANT CHANGE UP
EOSINOPHIL # BLD AUTO: 0.13 K/UL — SIGNIFICANT CHANGE UP (ref 0–0.5)
EOSINOPHIL NFR BLD AUTO: 1.9 % — SIGNIFICANT CHANGE UP (ref 0–6)
FERRITIN SERPL-MCNC: 634 NG/ML — HIGH (ref 30–400)
GLUCOSE SERPL-MCNC: 104 MG/DL — HIGH (ref 70–99)
GLUCOSE UR QL: NEGATIVE — SIGNIFICANT CHANGE UP
HCT VFR BLD CALC: 46.9 % — SIGNIFICANT CHANGE UP (ref 39–50)
HGB BLD-MCNC: 14.9 G/DL — SIGNIFICANT CHANGE UP (ref 13–17)
HIV 1+2 AB+HIV1 P24 AG SERPL QL IA: SIGNIFICANT CHANGE UP
IANC: 2.92 K/UL — SIGNIFICANT CHANGE UP (ref 1.5–8.5)
INR BLD: 1.14 RATIO — SIGNIFICANT CHANGE UP (ref 0.88–1.16)
IRON SATN MFR SERPL: 110 UG/DL — SIGNIFICANT CHANGE UP (ref 45–165)
IRON SATN MFR SERPL: 40 % — SIGNIFICANT CHANGE UP (ref 14–50)
KETONES UR-MCNC: ABNORMAL
LEUKOCYTE ESTERASE UR-ACNC: NEGATIVE — SIGNIFICANT CHANGE UP
LIDOCAIN IGE QN: 39 U/L — SIGNIFICANT CHANGE UP (ref 7–60)
LYMPHOCYTES # BLD AUTO: 2.05 K/UL — SIGNIFICANT CHANGE UP (ref 1–3.3)
LYMPHOCYTES # BLD AUTO: 31.1 % — SIGNIFICANT CHANGE UP (ref 13–44)
MCHC RBC-ENTMCNC: 24.1 PG — LOW (ref 27–34)
MCHC RBC-ENTMCNC: 31.8 GM/DL — LOW (ref 32–36)
MCV RBC AUTO: 75.9 FL — LOW (ref 80–100)
MONOCYTES # BLD AUTO: 0.68 K/UL — SIGNIFICANT CHANGE UP (ref 0–0.9)
MONOCYTES NFR BLD AUTO: 10.4 % — SIGNIFICANT CHANGE UP (ref 2–14)
NEUTROPHILS # BLD AUTO: 2.67 K/UL — SIGNIFICANT CHANGE UP (ref 1.8–7.4)
NEUTROPHILS NFR BLD AUTO: 40.6 % — LOW (ref 43–77)
NITRITE UR-MCNC: NEGATIVE — SIGNIFICANT CHANGE UP
PH UR: 6 — SIGNIFICANT CHANGE UP (ref 5–8)
PLATELET # BLD AUTO: 305 K/UL — SIGNIFICANT CHANGE UP (ref 150–400)
POTASSIUM SERPL-MCNC: 3.3 MMOL/L — LOW (ref 3.5–5.3)
POTASSIUM SERPL-SCNC: 3.3 MMOL/L — LOW (ref 3.5–5.3)
PROT SERPL-MCNC: 7.4 G/DL — SIGNIFICANT CHANGE UP (ref 6–8.3)
PROT UR-MCNC: ABNORMAL
PROTHROM AB SERPL-ACNC: 12.9 SEC — SIGNIFICANT CHANGE UP (ref 10.6–13.6)
RBC # BLD: 6.18 M/UL — HIGH (ref 4.2–5.8)
RBC # FLD: 15.4 % — HIGH (ref 10.3–14.5)
RH IG SCN BLD-IMP: POSITIVE — SIGNIFICANT CHANGE UP
SARS-COV-2 RNA SPEC QL NAA+PROBE: SIGNIFICANT CHANGE UP
SODIUM SERPL-SCNC: 139 MMOL/L — SIGNIFICANT CHANGE UP (ref 135–145)
SP GR SPEC: 1.02 — SIGNIFICANT CHANGE UP (ref 1.01–1.02)
TIBC SERPL-MCNC: 274 UG/DL — SIGNIFICANT CHANGE UP (ref 220–430)
TOXICOLOGY SCREEN, DRUGS OF ABUSE, SERUM RESULT: SIGNIFICANT CHANGE UP
TSH SERPL-MCNC: 1.2 UIU/ML — SIGNIFICANT CHANGE UP (ref 0.27–4.2)
UIBC SERPL-MCNC: 164 UG/DL — SIGNIFICANT CHANGE UP (ref 110–370)
UROBILINOGEN FLD QL: SIGNIFICANT CHANGE UP
WBC # BLD: 6.58 K/UL — SIGNIFICANT CHANGE UP (ref 3.8–10.5)
WBC # FLD AUTO: 6.58 K/UL — SIGNIFICANT CHANGE UP (ref 3.8–10.5)

## 2021-04-16 PROCEDURE — 93010 ELECTROCARDIOGRAM REPORT: CPT

## 2021-04-16 PROCEDURE — 99285 EMERGENCY DEPT VISIT HI MDM: CPT | Mod: 25

## 2021-04-16 PROCEDURE — 71045 X-RAY EXAM CHEST 1 VIEW: CPT | Mod: 26

## 2021-04-16 PROCEDURE — 99223 1ST HOSP IP/OBS HIGH 75: CPT | Mod: GC

## 2021-04-16 PROCEDURE — 74177 CT ABD & PELVIS W/CONTRAST: CPT | Mod: 26

## 2021-04-16 RX ORDER — MORPHINE SULFATE 50 MG/1
2 CAPSULE, EXTENDED RELEASE ORAL ONCE
Refills: 0 | Status: DISCONTINUED | OUTPATIENT
Start: 2021-04-16 | End: 2021-04-16

## 2021-04-16 RX ORDER — ACETAMINOPHEN 500 MG
650 TABLET ORAL EVERY 6 HOURS
Refills: 0 | Status: DISCONTINUED | OUTPATIENT
Start: 2021-04-16 | End: 2021-04-19

## 2021-04-16 RX ORDER — SODIUM CHLORIDE 0.65 %
1 AEROSOL, SPRAY (ML) NASAL
Refills: 0 | Status: DISCONTINUED | OUTPATIENT
Start: 2021-04-16 | End: 2021-04-16

## 2021-04-16 RX ORDER — KETOROLAC TROMETHAMINE 30 MG/ML
15 SYRINGE (ML) INJECTION EVERY 8 HOURS
Refills: 0 | Status: DISCONTINUED | OUTPATIENT
Start: 2021-04-16 | End: 2021-04-19

## 2021-04-16 RX ORDER — AMLODIPINE BESYLATE AND BENAZEPRIL HYDROCHLORIDE 10; 20 MG/1; MG/1
1 CAPSULE ORAL
Qty: 0 | Refills: 0 | DISCHARGE

## 2021-04-16 RX ORDER — AMLODIPINE BESYLATE 2.5 MG/1
5 TABLET ORAL DAILY
Refills: 0 | Status: DISCONTINUED | OUTPATIENT
Start: 2021-04-16 | End: 2021-04-19

## 2021-04-16 RX ORDER — ALLOPURINOL 300 MG
1 TABLET ORAL
Qty: 0 | Refills: 0 | DISCHARGE

## 2021-04-16 RX ADMIN — MORPHINE SULFATE 2 MILLIGRAM(S): 50 CAPSULE, EXTENDED RELEASE ORAL at 10:44

## 2021-04-16 RX ADMIN — AMLODIPINE BESYLATE 5 MILLIGRAM(S): 2.5 TABLET ORAL at 18:39

## 2021-04-16 RX ADMIN — MORPHINE SULFATE 2 MILLIGRAM(S): 50 CAPSULE, EXTENDED RELEASE ORAL at 13:07

## 2021-04-16 NOTE — ED ADULT NURSE NOTE - OBJECTIVE STATEMENT
Pt received to room 26 presents with generalized abd pain and hematuria. Pt a&ox4, ambulatory with assistance, skin intact however pt is jaundice, respirations even and unlabored, abd soft and non-distended, tender to palpation in all quadrants. Pt endorses fever on Mon and Tuesday, reports fever subsided. Pt reports he went to urgent care today and was advised to come to ER for eval. Pt reports he has been feeling dizzy, and states he has been "losing weight", unintentionally. Pt reports he has been anemic in the past , describes his urine as "dark". Pt denies dysuria, CP, SOB, chills or any other symptoms. 20G IV placed in rt arm, labs drawn and sent. Pt NSR on CM, pt refusing rectal temp. WIll continue to monitor.

## 2021-04-16 NOTE — ED PROVIDER NOTE - ATTENDING CONTRIBUTION TO CARE
Pt was seen and evaluated by me. Pt is a 59 y/o male with PMHx gastric sleeve, HTN, cervical fusion, and appendectomy who presented to the ED for fevers, weakness, and abdominal pain x 6 days. Pt states over the past 6 days having weakness with Tmax of 101 and generalized abd pain. Pt denies any nausea, vomiting, SOB, chest pain, or diarrhea. Pt states fever resolved 3 days ago but returned today. [facility] and had a COVID test that was negative. Pt went to his PMD today and was told there was blood in his urine and sent to the ED. Lungs CTA b/l. RRR. Abd soft, non-tender.  Concern for pacreatic CA/intra-abdominal infection/cholecysitis/UTI  Labs, UA, CT, Analgesia

## 2021-04-16 NOTE — H&P ADULT - NSHPLABSRESULTS_GEN_ALL_CORE
LABS:  04-16 @ 10:43 Creatine 19 U/L<L> [30 - 200]  cret                        14.9   6.58  )-----------( 305      ( 16 Apr 2021 10:39 )             46.9     04-16    139  |  100  |  14  ----------------------------<  104<H>  3.3<L>   |  27  |  0.83    Ca    9.7      16 Apr 2021 10:39    TPro  x   /  Alb  x   /  TBili  4.5<H>  /  DBili  3.4<H>  /  AST  x   /  ALT  x   /  AlkPhos  x   04-16    PT/INR - ( 16 Apr 2021 10:39 )   PT: 12.9 sec;   INR: 1.14 ratio         PTT - ( 16 Apr 2021 10:39 )  PTT:40.3 sec LABS:  04-16 @ 10:43 Creatine 19 U/L<L> [30 - 200]  cret                        14.9   6.58  )-----------( 305      ( 16 Apr 2021 10:39 )             46.9     04-16    139  |  100  |  14  ----------------------------<  104<H>  3.3<L>   |  27  |  0.83    Ca    9.7      16 Apr 2021 10:39    TPro  x   /  Alb  x   /  TBili  4.5<H>  /  DBili  3.4<H>  /  AST  x   /  ALT  x   /  AlkPhos  x   04-16    PT/INR - ( 16 Apr 2021 10:39 )   PT: 12.9 sec;   INR: 1.14 ratio         PTT - ( 16 Apr 2021 10:39 )  PTT:40.3 sec    CXR personally reviewed: clear lungs  CT A/P personally reviewed: GB and pancreas without inflammation

## 2021-04-16 NOTE — H&P ADULT - ASSESSMENT
59 y/o M PMHx gastric sleeve, HTN, cervical fusion, appendectomy p/w fevers, abdominal pain and jaundice, found to have transaminitis and bilirubinemia, with unclear etiology. 59 y/o M PMHx gastric sleeve, HTN, cervical fusion, appendectomy p/w fevers, abdominal pain and jaundice, found to have transaminitis and bilirubinemia, likely cholangitis. 61 y/o M PMHx gastric sleeve, HTN, cervical fusion, appendectomy p/w fevers, abdominal pain and jaundice, found to have transaminitis and bilirubinemia, likely obstructive jaundice, r/o choledocholithiasis.

## 2021-04-16 NOTE — H&P ADULT - NSICDXPASTSURGICALHX_GEN_ALL_CORE_FT
PAST SURGICAL HISTORY:  H/O spinal fusion cervical  x2 :  c3-c5  ( front )  and c2-c7  ( back  )    S/P appendectomy 7/27/17

## 2021-04-16 NOTE — ED ADULT NURSE REASSESSMENT NOTE - NS ED NURSE REASSESS COMMENT FT1
Pt bradycardic on CM, denies CP, SOB, dizziness or any other symptoms. MAYA Corbett aware, will continue to monitor.

## 2021-04-16 NOTE — H&P ADULT - PROBLEM SELECTOR PLAN 6
Transitions of Care Status:  1.  Name of PCP: Tania Nj  2.  PCP Contacted on Admission: [ ] Y    [ ] N    3.  PCP contacted at Discharge: [ ] Y    [ ] N    [ ] N/A  4.  Post-Discharge Appointment Date and Location:  5.  Summary of Handoff given to PCP:

## 2021-04-16 NOTE — ED PROVIDER NOTE - OBJECTIVE STATEMENT
Pt is a 59 y/o M PMHx gastric sleeve, HTN, cervical fusion, appendectomy p/w fevers, malaise and abdominal pain x 6 days.  Pt reports developing gradual onset malaise, fever Tmax 101F and gradual onset generalized abdominal pain, described as sharp pain, and was constant, lasting for 3 days, then resolving on its own.  Pt reports fevers resolved for past 3 days.  He also reports constant bilateral back pain for past 6 days, which worsens with movement.  Pt reports going to urgent care 5 days ago, had covid swab which was negative.  Pt states today began to develop lightheadedness upon standing up.  Pt reports he was seen by PMD today where he had urine evaluated and was told he had blood in urine for which he was directed to ED for evaluation.  Pt otherwise denies any chest pain, SOB, cough, nausea, vomiting, diarrhea, constipation, dysuria, cloudy urine, trauma, falls, numbness, focal weakness, illicit drug use, h/o cancer.

## 2021-04-16 NOTE — H&P ADULT - PROBLEM SELECTOR PLAN 4
Continue amlodipine 5 mg with hold parameters Continue amlodipine 5 mg with hold parameters  check orthostatics
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
## 2021-04-16 NOTE — H&P ADULT - HISTORY OF PRESENT ILLNESS
59 y/o M PMHx gastric sleeve (2017), HTN, cervical fusion, appendectomy p/w fevers, malaise and abdominal pain x 6 days.  Pt reports developing gradual onset malaise, fever Tmax 101F and gradual onset generalized abdominal pain, described as sharp pain, and was constant, lasting for 3 days, then resolving on its own.  Pt reports fevers resolved for past 3 days.  He also reports constant bilateral back pain for past 6 days, which worsens with movement.  Pt reports going to urgent care 5 days ago, had covid swab which was negative.  Pt states today began to develop lightheadedness upon standing up and using the restroom.  Pt reports he was seen by PMD today where he had urine evaluated and was told he had blood in urine for which he was directed to ED for evaluation. He reports 25-30 years ago he had an episode of jaundice in [place] where he was given some medication, but cannot recall many details of this. Pt otherwise denies any chest pain, SOB, cough, nausea, vomiting, diarrhea, constipation, dysuria, cloudy urine, trauma, falls, numbness, focal weakness, illicit drug use, h/o cancer.

## 2021-04-16 NOTE — H&P ADULT - PROBLEM SELECTOR PLAN 2
Elevated LFTs with bilirubin elevation, associated with abdominal pain as above  - F/u work-up as above Elevated LFTs with bilirubin elevation (mostly direct elevation), pointing to obstruction as most likely cause presentation  - F/u work-up as above

## 2021-04-16 NOTE — H&P ADULT - NSHPREVIEWOFSYSTEMS_GEN_ALL_CORE
REVIEW OF SYSTEMS:    CONSTITUTIONAL: No weakness, fevers or chills  EYES/ENT: No visual changes;  No vertigo or throat pain   MOUTH: No oral lesion, moist  NECK: No pain or stiffness  RESPIRATORY: No cough, wheezing, hemoptysis; No shortness of breath  CARDIOVASCULAR: No chest pain or palpitations  GASTROINTESTINAL: +RLQ and suprapubic pain. No nausea, vomiting, or hematemesis; No diarrhea or constipation. No melena or hematochezia.  GENITOURINARY: No dysuria, frequency or hematuria  MSK: +bilateral back pain  NEUROLOGICAL: No numbness or weakness  SKIN: No itching, rashes  PSYCH: no confusion or altered mental status

## 2021-04-16 NOTE — H&P ADULT - PROBLEM SELECTOR PLAN 5
DVT: Lovenox  Diet: DASH  Full code DVT: none indicated, encourage ambulation  Diet: NPO with sips of water  Full code

## 2021-04-16 NOTE — H&P ADULT - PROBLEM SELECTOR PLAN 3
Pt is s/p gastric sleeve in 2017. Unclear if current presentation is related to past surgical procedure. Pt used to be on CPAP for COOPER however reports he no longer requires it and has not used it in years Pt is s/p gastric sleeve in 2017. Pt used to be on CPAP for COOPER however reports he no longer requires it and has not used it in years

## 2021-04-16 NOTE — H&P ADULT - ATTENDING COMMENTS
60 yr old man PMHx obesity s/p gastric sleeve, HTN, cervical fusion, appendectomy p/w recent fevers, abdominal pain and jaundice. Found to have conjugated hyperbilirubinemia likely obstructive, r/o choledocholithiasis.    Presently afebrile and without leukocytosis, not meeting criteria for cholangitis  Send Bcx and start Zosyn if spikes fevers  Check MRCP  GI consult

## 2021-04-16 NOTE — H&P ADULT - NSHPPHYSICALEXAM_GEN_ALL_CORE
GENERAL: NAD, lying in bed comfortably  HEAD:  Atraumatic, normocephalic  EYES: EOMI, PERRLA, +icterus  ENT: Dry mucous membranes  NECK: Supple, no JVD  HEART: Regular rate and rhythm, no murmurs, rubs, or gallops  LUNGS: Unlabored respirations.  Clear to auscultation bilaterally, no crackles, wheezing, or rhonchi  ABDOMEN: Soft, TTP mostly in RLQ and suprapubic region, slight distention  EXTREMITIES: 2+ peripheral pulses bilaterally. No clubbing, cyanosis, or edema  NERVOUS SYSTEM:  A&Ox3, no focal deficits   SKIN: +jaundice Vital Signs Last 24 Hrs  T(C): 37.1 (16 Apr 2021 18:30), Max: 37.1 (16 Apr 2021 09:40)  T(F): 98.8 (16 Apr 2021 18:30), Max: 98.8 (16 Apr 2021 09:40)  HR: 58 (16 Apr 2021 18:30) (53 - 63)  BP: 157/100 (16 Apr 2021 18:30) (139/90 - 157/100)  BP(mean): --  RR: 16 (16 Apr 2021 18:30) (16 - 17)  SpO2: 100% (16 Apr 2021 18:30) (99% - 100%)    GENERAL: NAD, lying in bed comfortably  HEAD:  Atraumatic, normocephalic  EYES: EOMI, PERRLA, +icterus  ENT: Dry mucous membranes  NECK: Supple, no JVD  HEART: Regular rate and rhythm, no murmurs, rubs, or gallops  LUNGS: Unlabored respirations.  Clear to auscultation bilaterally, no crackles, wheezing, or rhonchi  ABDOMEN: Soft, TTP mostly in RLQ and suprapubic region, slight distention  EXTREMITIES: 2+ peripheral pulses bilaterally. No clubbing, cyanosis, or edema  NERVOUS SYSTEM:  A&Ox3, no focal deficits   SKIN: +jaundice

## 2021-04-16 NOTE — H&P ADULT - PROBLEM SELECTOR PLAN 1
Presents with gradual onset generalized abd pain i/s/o recent fever to 101, associated with b/l back pain and jaundice. Found to have blood in the urine by PCP. Labs notable for , , , Bili 4.5, INR wnl, CK wnl, lipase wnl. CT without acute pathology. Unclear etiology, possibly Gilbert's vs. dehydration vs. viral vs. autoimmune vs. post-surgical complication  - F/u hep panel  - F/u autoimmune panel  - F/u EBV, CMV  - F/u tox screen  - Possible GI consult  - Ibuprofen for mild pain, Toradol for severe pain Presents with gradual onset generalized abd pain i/s/o recent fever to 101, associated with b/l back pain and jaundice. Found to have blood in the urine by PCP. Labs notable for , , , Bili 4.5 (mostly direct), INR wnl, CK wnl, lipase wnl. CT without acute pathology. Most likely cholangitis ([place] score grade I consistent with mild acute cholangitis). DDx also includes Gilbert's (past jaundice episode) vs. viral (although only mild LFT) vs. autoimmune vs. post-surgical complication  - F/u MRCP  - GI consult for possible ERCP   - F/u hep panel  - F/u autoimmune panel  - F/u EBV, CMV  - F/u tox screen - negative for Tylenol  - Tylenol for mild pain, Toradol for severe pain Presents with gradual onset generalized abd pain i/s/o recent fever to 101, associated with b/l back pain and jaundice. Found to have blood in the urine by PCP. Labs notable for , , , Bili 4.5 (mostly direct), INR wnl, CK wnl, lipase wnl. CT without acute pathology. Currently not meeting criteria for cholangitis based on [place] guidelines. DDx includes choledocholithiasis, Gilbert's (past jaundice episode) vs. viral (although only mild LFT) vs. autoimmune vs. post-surgical complication  - F/u MRCP  - GI consult for possible ERCP pending results of MRCP  - F/u hep panel  - F/u autoimmune panel  - F/u EBV, CMV  - F/u tox screen - negative for Tylenol  - Tylenol for mild pain, Toradol for severe pain

## 2021-04-16 NOTE — ED PROVIDER NOTE - PHYSICAL EXAMINATION
5/5 strength bilateral LE; sensation intact to light touch, 2+ DP and radial pulses bilaterally, no LE edema; no calf tenderness; no palpable cords

## 2021-04-17 LAB
ALBUMIN SERPL ELPH-MCNC: 3.6 G/DL — SIGNIFICANT CHANGE UP (ref 3.3–5)
ALP SERPL-CCNC: 193 U/L — HIGH (ref 40–120)
ALT FLD-CCNC: 131 U/L — HIGH (ref 4–41)
ANION GAP SERPL CALC-SCNC: 14 MMOL/L — SIGNIFICANT CHANGE UP (ref 7–14)
AST SERPL-CCNC: 72 U/L — HIGH (ref 4–40)
BASOPHILS # BLD AUTO: 0.06 K/UL — SIGNIFICANT CHANGE UP (ref 0–0.2)
BASOPHILS NFR BLD AUTO: 0.9 % — SIGNIFICANT CHANGE UP (ref 0–2)
BILIRUB SERPL-MCNC: 4.2 MG/DL — HIGH (ref 0.2–1.2)
BUN SERPL-MCNC: 18 MG/DL — SIGNIFICANT CHANGE UP (ref 7–23)
CALCIUM SERPL-MCNC: 9.5 MG/DL — SIGNIFICANT CHANGE UP (ref 8.4–10.5)
CHLORIDE SERPL-SCNC: 100 MMOL/L — SIGNIFICANT CHANGE UP (ref 98–107)
CMV IGG FLD QL: 6.3 U/ML — HIGH
CMV IGG SERPL-IMP: POSITIVE
CMV IGM FLD-ACNC: <8 AU/ML — SIGNIFICANT CHANGE UP
CMV IGM SERPL QL: NEGATIVE — SIGNIFICANT CHANGE UP
CO2 SERPL-SCNC: 26 MMOL/L — SIGNIFICANT CHANGE UP (ref 22–31)
COVID-19 SPIKE DOMAIN AB INTERP: POSITIVE
COVID-19 SPIKE DOMAIN ANTIBODY RESULT: >250 U/ML — HIGH
CREAT SERPL-MCNC: 0.69 MG/DL — SIGNIFICANT CHANGE UP (ref 0.5–1.3)
CULTURE RESULTS: SIGNIFICANT CHANGE UP
EBV EA AB SER IA-ACNC: <5 U/ML — SIGNIFICANT CHANGE UP
EBV EA AB TITR SER IF: POSITIVE
EBV EA IGG SER-ACNC: NEGATIVE — SIGNIFICANT CHANGE UP
EBV NA IGG SER IA-ACNC: 83.1 U/ML — HIGH
EBV PATRN SPEC IB-IMP: SIGNIFICANT CHANGE UP
EBV VCA IGG AVIDITY SER QL IA: POSITIVE
EBV VCA IGM SER IA-ACNC: 57.5 U/ML — HIGH
EBV VCA IGM SER IA-ACNC: <10 U/ML — SIGNIFICANT CHANGE UP
EBV VCA IGM TITR FLD: NEGATIVE — SIGNIFICANT CHANGE UP
EOSINOPHIL # BLD AUTO: 0.13 K/UL — SIGNIFICANT CHANGE UP (ref 0–0.5)
EOSINOPHIL NFR BLD AUTO: 1.9 % — SIGNIFICANT CHANGE UP (ref 0–6)
GLUCOSE SERPL-MCNC: 81 MG/DL — SIGNIFICANT CHANGE UP (ref 70–99)
HAV IGM SER-ACNC: SIGNIFICANT CHANGE UP
HBV CORE IGM SER-ACNC: SIGNIFICANT CHANGE UP
HBV SURFACE AG SER-ACNC: SIGNIFICANT CHANGE UP
HCT VFR BLD CALC: 45.3 % — SIGNIFICANT CHANGE UP (ref 39–50)
HCV AB S/CO SERPL IA: 0.08 S/CO — SIGNIFICANT CHANGE UP (ref 0–0.99)
HCV AB S/CO SERPL IA: 0.08 S/CO — SIGNIFICANT CHANGE UP (ref 0–0.99)
HCV AB SERPL-IMP: SIGNIFICANT CHANGE UP
HCV AB SERPL-IMP: SIGNIFICANT CHANGE UP
HGB BLD-MCNC: 14.3 G/DL — SIGNIFICANT CHANGE UP (ref 13–17)
IANC: 3.38 K/UL — SIGNIFICANT CHANGE UP (ref 1.5–8.5)
IMM GRANULOCYTES NFR BLD AUTO: 3.2 % — HIGH (ref 0–1.5)
LYMPHOCYTES # BLD AUTO: 2.41 K/UL — SIGNIFICANT CHANGE UP (ref 1–3.3)
LYMPHOCYTES # BLD AUTO: 35.6 % — SIGNIFICANT CHANGE UP (ref 13–44)
MAGNESIUM SERPL-MCNC: 2 MG/DL — SIGNIFICANT CHANGE UP (ref 1.6–2.6)
MCHC RBC-ENTMCNC: 24.1 PG — LOW (ref 27–34)
MCHC RBC-ENTMCNC: 31.6 GM/DL — LOW (ref 32–36)
MCV RBC AUTO: 76.3 FL — LOW (ref 80–100)
MITOCHONDRIA AB SER-ACNC: SIGNIFICANT CHANGE UP
MONOCYTES # BLD AUTO: 0.57 K/UL — SIGNIFICANT CHANGE UP (ref 0–0.9)
MONOCYTES NFR BLD AUTO: 8.4 % — SIGNIFICANT CHANGE UP (ref 2–14)
NEUTROPHILS # BLD AUTO: 3.38 K/UL — SIGNIFICANT CHANGE UP (ref 1.8–7.4)
NEUTROPHILS NFR BLD AUTO: 50 % — SIGNIFICANT CHANGE UP (ref 43–77)
NRBC # BLD: 0 /100 WBCS — SIGNIFICANT CHANGE UP
NRBC # FLD: 0 K/UL — SIGNIFICANT CHANGE UP
PHOSPHATE SERPL-MCNC: 3.5 MG/DL — SIGNIFICANT CHANGE UP (ref 2.5–4.5)
PLATELET # BLD AUTO: 302 K/UL — SIGNIFICANT CHANGE UP (ref 150–400)
POTASSIUM SERPL-MCNC: 3.2 MMOL/L — LOW (ref 3.5–5.3)
POTASSIUM SERPL-SCNC: 3.2 MMOL/L — LOW (ref 3.5–5.3)
PROT SERPL-MCNC: 6.8 G/DL — SIGNIFICANT CHANGE UP (ref 6–8.3)
RBC # BLD: 5.94 M/UL — HIGH (ref 4.2–5.8)
RBC # FLD: 15.2 % — HIGH (ref 10.3–14.5)
SARS-COV-2 IGG+IGM SERPL QL IA: >250 U/ML — HIGH
SARS-COV-2 IGG+IGM SERPL QL IA: POSITIVE
SMOOTH MUSCLE AB SER-ACNC: SIGNIFICANT CHANGE UP
SODIUM SERPL-SCNC: 140 MMOL/L — SIGNIFICANT CHANGE UP (ref 135–145)
SPECIMEN SOURCE: SIGNIFICANT CHANGE UP
WBC # BLD: 6.77 K/UL — SIGNIFICANT CHANGE UP (ref 3.8–10.5)
WBC # FLD AUTO: 6.77 K/UL — SIGNIFICANT CHANGE UP (ref 3.8–10.5)

## 2021-04-17 PROCEDURE — 99233 SBSQ HOSP IP/OBS HIGH 50: CPT | Mod: GC

## 2021-04-17 RX ORDER — POTASSIUM CHLORIDE 20 MEQ
40 PACKET (EA) ORAL ONCE
Refills: 0 | Status: COMPLETED | OUTPATIENT
Start: 2021-04-17 | End: 2021-04-17

## 2021-04-17 RX ADMIN — Medication 40 MILLIEQUIVALENT(S): at 08:54

## 2021-04-17 RX ADMIN — AMLODIPINE BESYLATE 5 MILLIGRAM(S): 2.5 TABLET ORAL at 05:26

## 2021-04-17 NOTE — PROGRESS NOTE ADULT - ASSESSMENT
59 y/o M PMHx gastric sleeve, HTN, cervical fusion, appendectomy p/w fevers, abdominal pain and jaundice, found to have transaminitis and bilirubinemia, likely obstructive jaundice, r/o choledocholithiasis.

## 2021-04-17 NOTE — PROGRESS NOTE ADULT - PROBLEM SELECTOR PLAN 1
Presents with gradual onset generalized abd pain i/s/o recent fever to 101, associated with b/l back pain and jaundice. Found to have blood in the urine by PCP. Labs notable for , , , Bili 4.5 (mostly direct), INR wnl, CK wnl, lipase wnl. CT without acute pathology. Currently not meeting criteria for cholangitis based on [place] guidelines. DDx includes choledocholithiasis vs. passed stone vs. viral (although only mild LFT and negative hep panel) vs. autoimmune vs. post-surgical complication  - F/u MRCP  - GI consult for possible ERCP pending results of MRCP  - Hep panel negative  - Tox screen negative  - F/u autoimmune panel  - F/u EBV, CMV  - Tylenol for mild pain, Toradol for severe pain
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
## 2021-04-17 NOTE — CONSULT NOTE ADULT - ATTENDING COMMENTS
Case Management:  Discussed discharge needs with patient and she tells cm she is independent in all aspects of care and does not anticipate need for any assistance at discharge. Patient is active with a primary care doctor and has no issues with transportation as she is an active . Patient is not active with any home care agency and does not anticipate need for one at discharge. Patient tells cm she is in the donut hole with her medication and she is on a few that are costly but for the most part it is manageable. Family will transport home.   Electronically signed by Filiberto Gordon on 8/19/2020 at 5:27 PM 59 y/o M w/ hx of gastric sleeve (2017), HTN, cervical fusion, appendectomy p/w fevers, malaise and abdominal pain x 6 days  Lab findings concerning for hyperbilirubinemia and elevations in AST/ALT.  CT Abdomen unrevealing.  Agree with MRI/MRCP w/ IV contrast.  Please send AIH and viral hepatitis w/u.  May need liver biopsy.  Microcytic anemia.  ?PSC w/ UC or Crohns.

## 2021-04-17 NOTE — PROGRESS NOTE ADULT - ATTENDING COMMENTS
60 yr old man PMHx obesity s/p gastric sleeve, HTN, cervical fusion, appendectomy p/w recent fevers, abdominal pain and jaundice. Found to have conjugated hyperbilirubinemia likely obstructive, r/o choledocholithiasis. Patient seen evaluated at bedside. Reports improvement in abdominal pain, nausea, vomiting.     Presently afebrile and without leukocytosis, not meeting criteria for cholangitis  Send Bcx and start Zosyn if spikes fevers  Check MRCP- pending.   Hepatology consulted, appreciate recs.

## 2021-04-17 NOTE — PROGRESS NOTE ADULT - PROBLEM SELECTOR PLAN 2
Elevated LFTs with bilirubin elevation (mostly direct elevation), pointing to obstruction as most likely cause presentation. Possibly from passed stone  - F/u work-up as above

## 2021-04-17 NOTE — CONSULT NOTE ADULT - ASSESSMENT
61 y/o M PMHx gastric sleeve (2017), HTN, cervical fusion, appendectomy p/w fevers, malaise and abdominal pain x 6 days - found to have elevated transaminases and bilirubin with unremarkable CT.     Impression:  #Elevated transaminases with elevated bilirubin - mixed pattern with R factor 2.2; no biliary obstruction/enhancement or abnormality on imaging, no cholelithiasis although could be passed stone. Liver also appears normal on imaging. Normal INR and platelets. Differential includes viral hepatitis, CMV, EBV, HSV, VZV, DILI, cholestasis due to sepsis, DILI, passed stone, autoimmune disease. 61 y/o M PMHx gastric sleeve (2017), HTN, cervical fusion, appendectomy p/w fevers, malaise and abdominal pain x 6 days - found to have elevated transaminases and bilirubin with unremarkable CT.     Impression:  #Elevated transaminases with elevated bilirubin - mixed pattern with R factor 2.2; no biliary obstruction/enhancement or abnormality on imaging, no cholelithiasis although could be passed stone. Liver also appears normal on imaging. Normal INR and platelets. Differential includes autoimmune disease such as PSC rakesh given microcytosis, viral hepatitis, DILI, cholestasis due to sepsis, DILI, passed stone.       Recommendation:  - f/u MRI/MRCP  - send autoimmune work up: ROSA M, ANCA, ASMA, AMA, immunoglobulins, anti-liver/kidney microsomal antibody type 1  - other viral serologies: EBV PCR, CMV PCR, hep E serology  - would send a set of blood cultures given reports of fevers at home  - if microcytosis is new, may need repeat EGD/colonoscopy for evaluation of IBD/malignancy  - rest of care per primary team      Rosa Shearer PGY-4  Gastroenterology Fellow  Pager #[phone] ([facility]) or [phone] (NS)  Available on [employer] Teams.  Please contact on-call GI fellow via answering service ([phone]) after 5pm and before 8am, and on weekends.            59 y/o M PMHx gastric sleeve (2017), HTN, cervical fusion, appendectomy p/w fevers, malaise and abdominal pain x 6 days - found to have elevated transaminases and bilirubin with unremarkable CT.     Impression:  #Elevated transaminases with elevated bilirubin - mixed pattern with R factor 2.2; no biliary obstruction/enhancement or abnormality on imaging, no cholelithiasis although could be passed stone. Liver also appears normal on imaging. Normal INR and platelets. Differential includes autoimmune disease such as PSC rakesh given microcytosis, viral hepatitis, DILI, cholestasis due to sepsis, DILI, passed stone.       Recommendation:  - f/u MRI/MRCP  - send autoimmune work up: ROSA M, ANCA, ASMA, AMA, immunoglobulins, anti-liver/kidney microsomal antibody type 1  - other viral serologies: EBV PCR, CMV PCR, hep E IgM  - would send a set of blood cultures given reports of fevers at home  - if microcytosis is new, may need repeat EGD/colonoscopy for evaluation of IBD/malignancy  - rest of care per primary team      Rosa Shearer PGY-4  Gastroenterology Fellow  Pager #[phone] ([facility]) or [phone] (NS)  Available on [employer] Teams.  Please contact on-call GI fellow via answering service ([phone]) after 5pm and before 8am, and on weekends.

## 2021-04-17 NOTE — PROGRESS NOTE ADULT - SUBJECTIVE AND OBJECTIVE BOX
PROGRESS NOTE:     CONTACT INFO:  Scooby Colbert MD  Internal Medicine PGY1  Pager: [phone]    Patient is a 60y old  Male who presents with a chief complaint of fever, abdominal pain, jaundice (2021 08:12)      SUBJECTIVE / OVERNIGHT EVENTS:  No acute events overnight. Pt reports no further abdominal pain, no f/c, n/v. Endorses NPO status for possible MRCP.     MEDICATIONS  (STANDING):  amLODIPine   Tablet 5 milliGRAM(s) Oral daily    MEDICATIONS  (PRN):  acetaminophen   Tablet .. 650 milliGRAM(s) Oral every 6 hours PRN Temp greater or equal to 38C (100.4F), Mild Pain (1 - 3), Moderate Pain (4 - 6)  ketorolac   Injectable 15 milliGRAM(s) IV Push every 8 hours PRN Severe Pain (7 - 10)      CAPILLARY BLOOD GLUCOSE        I&O's Summary      PHYSICAL EXAM:  Vital Signs Last 24 Hrs  T(C): 36.9 (2021 05:22), Max: 37.2 (2021 22:40)  T(F): 98.5 (2021 05:22), Max: 98.9 (2021 22:40)  HR: 54 (2021 05:22) (53 - 60)  BP: 121/77 (2021 05:22) (106/81 - 157/100)  BP(mean): --  RR: 18 (2021 05:22) (16 - 18)  SpO2: 95% (2021 05:22) (95% - 100%)    GENERAL: NAD, lying in bed comfortably  HEAD:  Atraumatic, normocephalic  EYES: EOMI, PERRLA, +icterus  ENT: Moist mucous membranes  NECK: Supple, no JVD  HEART: Regular rate and rhythm, no murmurs, rubs, or gallops  LUNGS: Unlabored respirations.  Clear to auscultation bilaterally, no crackles, wheezing, or rhonchi  ABDOMEN: Soft, NTP, slight distention  EXTREMITIES: 2+ peripheral pulses bilaterally. No clubbing, cyanosis, or edema  NERVOUS SYSTEM:  A&Ox3, no focal deficits   SKIN: +jaundice    LABS:                        14.3   6.77  )-----------( 302      ( 2021 07:44 )             45.3         140  |  100  |  18  ----------------------------<  81  3.2<L>   |  26  |  0.69    Ca    9.5      2021 07:44  Phos  3.5       Mg     2.0         TPro  6.8  /  Alb  3.6  /  TBili  4.2<H>  /  DBili  x   /  AST  72<H>  /  ALT  131<H>  /  AlkPhos  193<H>      PT/INR - ( 2021 10:39 )   PT: 12.9 sec;   INR: 1.14 ratio         PTT - ( 2021 10:39 )  PTT:40.3 sec  CARDIAC MARKERS ( 2021 10:43 )  x     / x     / 19 U/L / x     / x          Urinalysis Basic - ( 2021 13:26 )    Color: Thelma / Appearance: Slightly Turbid / S.017 / pH: x  Gluc: x / Ketone: Trace  / Bili: Small / Urobili: <2 mg/dL   Blood: x / Protein: Trace / Nitrite: Negative   Leuk Esterase: Negative / RBC: 3 /HPF / WBC 0 /HPF   Sq Epi: x / Non Sq Epi: 0 /HPF / Bacteria: Negative          RADIOLOGY & ADDITIONAL TESTS:  Results Reviewed:   Imaging Personally Reviewed:  Electrocardiogram Personally Reviewed:    COORDINATION OF CARE:  Care Discussed with Consultants/Other Providers [Y/N]:  Prior or Outpatient Records Reviewed [Y/N]:

## 2021-04-18 LAB
A1AT SERPL-MCNC: 169 MG/DL — SIGNIFICANT CHANGE UP (ref 90–200)
ALBUMIN SERPL ELPH-MCNC: 3.7 G/DL — SIGNIFICANT CHANGE UP (ref 3.3–5)
ALP SERPL-CCNC: 217 U/L — HIGH (ref 40–120)
ALT FLD-CCNC: 129 U/L — HIGH (ref 4–41)
ANION GAP SERPL CALC-SCNC: 12 MMOL/L — SIGNIFICANT CHANGE UP (ref 7–14)
AST SERPL-CCNC: 59 U/L — HIGH (ref 4–40)
BASOPHILS # BLD AUTO: 0.05 K/UL — SIGNIFICANT CHANGE UP (ref 0–0.2)
BASOPHILS NFR BLD AUTO: 0.6 % — SIGNIFICANT CHANGE UP (ref 0–2)
BILIRUB DIRECT SERPL-MCNC: 2.5 MG/DL — HIGH (ref 0–0.2)
BILIRUB SERPL-MCNC: 3.8 MG/DL — HIGH (ref 0.2–1.2)
BUN SERPL-MCNC: 29 MG/DL — HIGH (ref 7–23)
CALCIUM SERPL-MCNC: 9.6 MG/DL — SIGNIFICANT CHANGE UP (ref 8.4–10.5)
CERULOPLASMIN SERPL-MCNC: 28 MG/DL — SIGNIFICANT CHANGE UP (ref 15–30)
CHLORIDE SERPL-SCNC: 100 MMOL/L — SIGNIFICANT CHANGE UP (ref 98–107)
CO2 SERPL-SCNC: 27 MMOL/L — SIGNIFICANT CHANGE UP (ref 22–31)
CREAT SERPL-MCNC: 0.88 MG/DL — SIGNIFICANT CHANGE UP (ref 0.5–1.3)
EOSINOPHIL # BLD AUTO: 0.18 K/UL — SIGNIFICANT CHANGE UP (ref 0–0.5)
EOSINOPHIL NFR BLD AUTO: 2.2 % — SIGNIFICANT CHANGE UP (ref 0–6)
GLUCOSE SERPL-MCNC: 95 MG/DL — SIGNIFICANT CHANGE UP (ref 70–99)
HCT VFR BLD CALC: 45.5 % — SIGNIFICANT CHANGE UP (ref 39–50)
HGB BLD-MCNC: 14.3 G/DL — SIGNIFICANT CHANGE UP (ref 13–17)
IANC: 3.84 K/UL — SIGNIFICANT CHANGE UP (ref 1.5–8.5)
IMM GRANULOCYTES NFR BLD AUTO: 4.2 % — HIGH (ref 0–1.5)
LYMPHOCYTES # BLD AUTO: 3.13 K/UL — SIGNIFICANT CHANGE UP (ref 1–3.3)
LYMPHOCYTES # BLD AUTO: 38.5 % — SIGNIFICANT CHANGE UP (ref 13–44)
MAGNESIUM SERPL-MCNC: 2.3 MG/DL — SIGNIFICANT CHANGE UP (ref 1.6–2.6)
MCHC RBC-ENTMCNC: 24 PG — LOW (ref 27–34)
MCHC RBC-ENTMCNC: 31.4 GM/DL — LOW (ref 32–36)
MCV RBC AUTO: 76.5 FL — LOW (ref 80–100)
MONOCYTES # BLD AUTO: 0.6 K/UL — SIGNIFICANT CHANGE UP (ref 0–0.9)
MONOCYTES NFR BLD AUTO: 7.4 % — SIGNIFICANT CHANGE UP (ref 2–14)
NEUTROPHILS # BLD AUTO: 3.84 K/UL — SIGNIFICANT CHANGE UP (ref 1.8–7.4)
NEUTROPHILS NFR BLD AUTO: 47.1 % — SIGNIFICANT CHANGE UP (ref 43–77)
NRBC # BLD: 0 /100 WBCS — SIGNIFICANT CHANGE UP
NRBC # FLD: 0 K/UL — SIGNIFICANT CHANGE UP
PHOSPHATE SERPL-MCNC: 3.7 MG/DL — SIGNIFICANT CHANGE UP (ref 2.5–4.5)
PLATELET # BLD AUTO: 361 K/UL — SIGNIFICANT CHANGE UP (ref 150–400)
POTASSIUM SERPL-MCNC: 3.7 MMOL/L — SIGNIFICANT CHANGE UP (ref 3.5–5.3)
POTASSIUM SERPL-SCNC: 3.7 MMOL/L — SIGNIFICANT CHANGE UP (ref 3.5–5.3)
PROT SERPL-MCNC: 7.5 G/DL — SIGNIFICANT CHANGE UP (ref 6–8.3)
RBC # BLD: 5.95 M/UL — HIGH (ref 4.2–5.8)
RBC # FLD: 15.7 % — HIGH (ref 10.3–14.5)
SODIUM SERPL-SCNC: 139 MMOL/L — SIGNIFICANT CHANGE UP (ref 135–145)
WBC # BLD: 8.14 K/UL — SIGNIFICANT CHANGE UP (ref 3.8–10.5)
WBC # FLD AUTO: 8.14 K/UL — SIGNIFICANT CHANGE UP (ref 3.8–10.5)

## 2021-04-18 PROCEDURE — 99233 SBSQ HOSP IP/OBS HIGH 50: CPT | Mod: GC

## 2021-04-18 RX ORDER — SIMETHICONE 80 MG/1
80 TABLET, CHEWABLE ORAL THREE TIMES A DAY
Refills: 0 | Status: DISCONTINUED | OUTPATIENT
Start: 2021-04-18 | End: 2021-04-19

## 2021-04-18 RX ORDER — SENNA PLUS 8.6 MG/1
2 TABLET ORAL AT BEDTIME
Refills: 0 | Status: DISCONTINUED | OUTPATIENT
Start: 2021-04-18 | End: 2021-04-19

## 2021-04-18 RX ORDER — POLYETHYLENE GLYCOL 3350 17 G/17G
17 POWDER, FOR SOLUTION ORAL DAILY
Refills: 0 | Status: DISCONTINUED | OUTPATIENT
Start: 2021-04-18 | End: 2021-04-19

## 2021-04-18 RX ADMIN — POLYETHYLENE GLYCOL 3350 17 GRAM(S): 17 POWDER, FOR SOLUTION ORAL at 11:34

## 2021-04-18 RX ADMIN — SIMETHICONE 80 MILLIGRAM(S): 80 TABLET, CHEWABLE ORAL at 22:03

## 2021-04-18 RX ADMIN — SIMETHICONE 80 MILLIGRAM(S): 80 TABLET, CHEWABLE ORAL at 13:35

## 2021-04-18 RX ADMIN — AMLODIPINE BESYLATE 5 MILLIGRAM(S): 2.5 TABLET ORAL at 06:44

## 2021-04-18 RX ADMIN — SENNA PLUS 2 TABLET(S): 8.6 TABLET ORAL at 22:03

## 2021-04-18 NOTE — PROGRESS NOTE ADULT - SUBJECTIVE AND OBJECTIVE BOX
Ariana Wright MD  Internal Medicine PGY-2  Pager [phone]  Pager [phone]      Patient is a 60y old  Male who presents with a chief complaint of fever, abdominal pain, jaundice (2021 08:12)        SUBJECTIVE / OVERNIGHT EVENTS: Patient had no acute events overnight. Patient seen and examined at bedside this morning. Last BM few days ago. Endorsed diffused itching for 30mins in the morning which self resolved    ROS: [ - ] Fever [ - ] Chills [ - ] Nausea/Vomiting [ - ] Chest Pain [ - ] Shortness of breath     MEDICATIONS  (STANDING):  amLODIPine   Tablet 5 milliGRAM(s) Oral daily  polyethylene glycol 3350 17 Gram(s) Oral daily  senna 2 Tablet(s) Oral at bedtime  simethicone 80 milliGRAM(s) Chew three times a day    MEDICATIONS  (PRN):  acetaminophen   Tablet .. 650 milliGRAM(s) Oral every 6 hours PRN Temp greater or equal to 38C (100.4F), Mild Pain (1 - 3), Moderate Pain (4 - 6)  ketorolac   Injectable 15 milliGRAM(s) IV Push every 8 hours PRN Severe Pain (7 - 10)      Vital Signs Last 24 Hrs  T(C): 36.9 (2021 06:36), Max: 37 (2021 21:19)  T(F): 98.4 (2021 06:36), Max: 98.6 (2021 21:19)  HR: 52 (2021 06:36) (52 - 57)  BP: 113/66 (2021 06:36) (113/66 - 115/78)  BP(mean): --  RR: 18 (2021 06:36) (18 - 18)  SpO2: 97% (2021 06:36) (97% - 98%)  CAPILLARY BLOOD GLUCOSE        I&O's Summary      PHYSICAL EXAM  GENERAL: NAD, lying comfortably in bed   HEENT:  Atraumatic, Normocephalic, EOMI, conjunctiva and sclera clear, no LAD  CHEST/LUNG: Clear to auscultation bilaterally; No wheeze  HEART: RRR, S1 and S2 No murmurs, rubs, or gallops  ABDOMEN: Soft, Nontender, Nondistended; Bowel sounds present  EXTREMITIES:  2+ Peripheral Pulses, No clubbing, cyanosis, or edema  NEURO: AAOx3, non-focal  SKIN: No rashes or lesions    LABS:                        14.3   8.14  )-----------( 361      ( 2021 07:57 )             45.5     04-18    139  |  100  |  29<H>  ----------------------------<  95  3.7   |  27  |  0.88    Ca    9.6      2021 07:57  Phos  3.7     04-18  Mg     2.3     18    TPro  7.5  /  Alb  3.7  /  TBili  3.8<H>  /  DBili  2.5<H>  /  AST  59<H>  /  ALT  129<H>  /  AlkPhos  217<H>  18          Urinalysis Basic - ( 2021 13:26 )    Color: Thelma / Appearance: Slightly Turbid / S.017 / pH: x  Gluc: x / Ketone: Trace  / Bili: Small / Urobili: <2 mg/dL   Blood: x / Protein: Trace / Nitrite: Negative   Leuk Esterase: Negative / RBC: 3 /HPF / WBC 0 /HPF   Sq Epi: x / Non Sq Epi: 0 /HPF / Bacteria: Negative          RADIOLOGY & ADDITIONAL TESTS:    Imaging Personally Reviewed:  Consultant(s) Notes Reviewed:

## 2021-04-18 NOTE — PROGRESS NOTE ADULT - ATTENDING COMMENTS
A 60 yr old man PMHx obesity s/p gastric sleeve, HTN, cervical fusion, appendectomy p/w recent fevers, abdominal pain and jaundice. Found to have conjugated hyperbilirubinemia likely obstructive, r/o choledocholithiasis. Patient seen evaluated at bedside. Reports improvement in abdominal pain, nausea, vomiting.     Presently afebrile and without leukocytosis, not meeting criteria for cholangitis.   Low threshold to start Zosyn if spikes fevers, send blood cultures.   Hepatology consulted, appreciate recs.  Check MRCP- pending.   AI work up and viral hepatitis work up sent.   Dispo: pending MRCP, in 1-2 days.

## 2021-04-19 ENCOUNTER — TRANSCRIPTION ENCOUNTER (OUTPATIENT)
Age: 61
End: 2021-04-19

## 2021-04-19 VITALS
SYSTOLIC BLOOD PRESSURE: 133 MMHG | HEART RATE: 60 BPM | RESPIRATION RATE: 18 BRPM | DIASTOLIC BLOOD PRESSURE: 66 MMHG | TEMPERATURE: 98 F | OXYGEN SATURATION: 98 %

## 2021-04-19 LAB
ALBUMIN SERPL ELPH-MCNC: 3.5 G/DL — SIGNIFICANT CHANGE UP (ref 3.3–5)
ALP SERPL-CCNC: 183 U/L — HIGH (ref 40–120)
ALT FLD-CCNC: 121 U/L — HIGH (ref 4–41)
ANA TITR SER: NEGATIVE — SIGNIFICANT CHANGE UP
ANION GAP SERPL CALC-SCNC: 10 MMOL/L — SIGNIFICANT CHANGE UP (ref 7–14)
AST SERPL-CCNC: 55 U/L — HIGH (ref 4–40)
BASOPHILS # BLD AUTO: 0.08 K/UL — SIGNIFICANT CHANGE UP (ref 0–0.2)
BASOPHILS NFR BLD AUTO: 1.1 % — SIGNIFICANT CHANGE UP (ref 0–2)
BILIRUB DIRECT SERPL-MCNC: 1.8 MG/DL — HIGH (ref 0–0.2)
BILIRUB SERPL-MCNC: 3.2 MG/DL — HIGH (ref 0.2–1.2)
BUN SERPL-MCNC: 21 MG/DL — SIGNIFICANT CHANGE UP (ref 7–23)
CALCIUM SERPL-MCNC: 9.5 MG/DL — SIGNIFICANT CHANGE UP (ref 8.4–10.5)
CHLORIDE SERPL-SCNC: 102 MMOL/L — SIGNIFICANT CHANGE UP (ref 98–107)
CO2 SERPL-SCNC: 30 MMOL/L — SIGNIFICANT CHANGE UP (ref 22–31)
CREAT SERPL-MCNC: 0.95 MG/DL — SIGNIFICANT CHANGE UP (ref 0.5–1.3)
EOSINOPHIL # BLD AUTO: 0.19 K/UL — SIGNIFICANT CHANGE UP (ref 0–0.5)
EOSINOPHIL NFR BLD AUTO: 2.6 % — SIGNIFICANT CHANGE UP (ref 0–6)
GLUCOSE SERPL-MCNC: 97 MG/DL — SIGNIFICANT CHANGE UP (ref 70–99)
HCT VFR BLD CALC: 44.4 % — SIGNIFICANT CHANGE UP (ref 39–50)
HGB BLD-MCNC: 13.5 G/DL — SIGNIFICANT CHANGE UP (ref 13–17)
IANC: 3.11 K/UL — SIGNIFICANT CHANGE UP (ref 1.5–8.5)
IGA FLD-MCNC: 230 MG/DL — SIGNIFICANT CHANGE UP (ref 70–400)
IGA FLD-MCNC: 232 MG/DL — SIGNIFICANT CHANGE UP (ref 84–499)
IGG FLD-MCNC: 1125 MG/DL — SIGNIFICANT CHANGE UP (ref 700–1600)
IGG FLD-MCNC: 1229 MG/DL — SIGNIFICANT CHANGE UP (ref 610–1660)
IGM SERPL-MCNC: 118 MG/DL — SIGNIFICANT CHANGE UP (ref 40–230)
IGM SERPL-MCNC: 125 MG/DL — SIGNIFICANT CHANGE UP (ref 35–242)
IMM GRANULOCYTES NFR BLD AUTO: 4.8 % — HIGH (ref 0–1.5)
KAPPA LC SER QL IFE: 4.27 MG/DL — HIGH (ref 0.33–1.94)
KAPPA/LAMBDA FREE LIGHT CHAIN RATIO, SERUM: 2.79 RATIO — HIGH (ref 0.26–1.65)
LAMBDA LC SER QL IFE: 1.53 MG/DL — SIGNIFICANT CHANGE UP (ref 0.57–2.63)
LKM AB SER-ACNC: <20.1 UNITS — SIGNIFICANT CHANGE UP (ref 0–20)
LYMPHOCYTES # BLD AUTO: 2.81 K/UL — SIGNIFICANT CHANGE UP (ref 1–3.3)
LYMPHOCYTES # BLD AUTO: 38.8 % — SIGNIFICANT CHANGE UP (ref 13–44)
MAGNESIUM SERPL-MCNC: 2.4 MG/DL — SIGNIFICANT CHANGE UP (ref 1.6–2.6)
MCHC RBC-ENTMCNC: 23.5 PG — LOW (ref 27–34)
MCHC RBC-ENTMCNC: 30.4 GM/DL — LOW (ref 32–36)
MCV RBC AUTO: 77.2 FL — LOW (ref 80–100)
MONOCYTES # BLD AUTO: 0.7 K/UL — SIGNIFICANT CHANGE UP (ref 0–0.9)
MONOCYTES NFR BLD AUTO: 9.7 % — SIGNIFICANT CHANGE UP (ref 2–14)
NEUTROPHILS # BLD AUTO: 3.11 K/UL — SIGNIFICANT CHANGE UP (ref 1.8–7.4)
NEUTROPHILS NFR BLD AUTO: 43 % — SIGNIFICANT CHANGE UP (ref 43–77)
NRBC # BLD: 0 /100 WBCS — SIGNIFICANT CHANGE UP
NRBC # FLD: 0 K/UL — SIGNIFICANT CHANGE UP
PHOSPHATE SERPL-MCNC: 3.7 MG/DL — SIGNIFICANT CHANGE UP (ref 2.5–4.5)
PLATELET # BLD AUTO: 363 K/UL — SIGNIFICANT CHANGE UP (ref 150–400)
POTASSIUM SERPL-MCNC: 3.8 MMOL/L — SIGNIFICANT CHANGE UP (ref 3.5–5.3)
POTASSIUM SERPL-SCNC: 3.8 MMOL/L — SIGNIFICANT CHANGE UP (ref 3.5–5.3)
PROT SERPL-MCNC: 6.8 G/DL — SIGNIFICANT CHANGE UP (ref 6–8.3)
RBC # BLD: 5.75 M/UL — SIGNIFICANT CHANGE UP (ref 4.2–5.8)
RBC # FLD: 15.6 % — HIGH (ref 10.3–14.5)
SODIUM SERPL-SCNC: 142 MMOL/L — SIGNIFICANT CHANGE UP (ref 135–145)
WBC # BLD: 7.24 K/UL — SIGNIFICANT CHANGE UP (ref 3.8–10.5)
WBC # FLD AUTO: 7.24 K/UL — SIGNIFICANT CHANGE UP (ref 3.8–10.5)

## 2021-04-19 PROCEDURE — 99232 SBSQ HOSP IP/OBS MODERATE 35: CPT | Mod: GC

## 2021-04-19 PROCEDURE — 99239 HOSP IP/OBS DSCHRG MGMT >30: CPT

## 2021-04-19 PROCEDURE — 74183 MRI ABD W/O CNTR FLWD CNTR: CPT | Mod: 26

## 2021-04-19 RX ADMIN — SIMETHICONE 80 MILLIGRAM(S): 80 TABLET, CHEWABLE ORAL at 05:08

## 2021-04-19 RX ADMIN — SIMETHICONE 80 MILLIGRAM(S): 80 TABLET, CHEWABLE ORAL at 13:31

## 2021-04-19 RX ADMIN — POLYETHYLENE GLYCOL 3350 17 GRAM(S): 17 POWDER, FOR SOLUTION ORAL at 13:33

## 2021-04-19 RX ADMIN — AMLODIPINE BESYLATE 5 MILLIGRAM(S): 2.5 TABLET ORAL at 05:08

## 2021-04-19 NOTE — DISCHARGE NOTE PROVIDER - NSDCCPCAREPLAN_GEN_ALL_CORE_FT
PRINCIPAL DISCHARGE DIAGNOSIS  Diagnosis: Abdominal pain  Assessment and Plan of Treatment: You came to the hospital with abdominal pain. You had an MRI of the abdomen which showed many small stones in the gallbladder, but was otherwise negative. You were evaluated by the GI doctors who recommended      SECONDARY DISCHARGE DIAGNOSES  Diagnosis: Renal lesion  Assessment and Plan of Treatment: On your MRCP, you had an incidental finding of a kidney mass. At this time, it is not clear what exactly the mass is but there is concern that is could be cancer. The lesion right now is very small, however it needs to have further surveillance after you leave the hospital. You will need to follow-up with the urology doctors ([facility]) within a month to further evaluate and treat this finding.     PRINCIPAL DISCHARGE DIAGNOSIS  Diagnosis: Abdominal pain  Assessment and Plan of Treatment: You came to the hospital with abdominal pain. You had an MRI of the abdomen which showed many small stones in the gallbladder, but was otherwise negative. You were evaluated by the GI doctors who recommended that you follow-up with them following discharge from the hospital. Please bring this paper with you to your appointments.      SECONDARY DISCHARGE DIAGNOSES  Diagnosis: Renal lesion  Assessment and Plan of Treatment: On your MRCP, you had an incidental finding of a kidney mass. At this time, it is not clear what exactly the mass is but there is concern that is could be cancer. The lesion right now is very small, however it needs to have further surveillance after you leave the hospital. You will need to follow-up with the urology doctors ([facility]) within a month to further evaluate and treat this finding.     PRINCIPAL DISCHARGE DIAGNOSIS  Diagnosis: Abdominal pain  Assessment and Plan of Treatment: You came to the hospital with abdominal pain. You had an MRI of the abdomen which showed many small stones in the gallbladder, but was otherwise negative. You were evaluated by the GI doctors who recommended that you follow-up with them following discharge from the hospital. Please bring this paper with you to your appointments.      SECONDARY DISCHARGE DIAGNOSES  Diagnosis: Renal lesion  Assessment and Plan of Treatment: On your MRI, you had an incidental finding of a kidney mass. At this time, it is not clear what exactly the mass is but there is concern that is could be cancer. The lesion right now is very small, however it needs to have further surveillance after you leave the hospital. You will need to follow-up with the urology doctors ([facility]) within a month to further evaluate and treat this finding.

## 2021-04-19 NOTE — PROGRESS NOTE ADULT - ATTENDING COMMENTS
A 59 y/o M from [place], with history of s/p gastric sleeve (2017), HTN, cervical fusion, appendectomy presented with fevers, malaise and abdominal pain x 6 days - found to have elevated transaminases and jaundice with unremarkable CT.   Patient had remote history of jaundice and denies, hepatitis history, family history of liver disease, or use of alcohol, OTC or supplements.   He has RUQ tenderness without nausea or vomiting, fever subsided and elevated liver tests improving. Workup for liver disease so far is unrevealing.     A/P: patient may have acalculous cholecystitis, or transient CBD obstruction or immune mediated cholangiopathy and less likely DILI.   Would recommend-complete workup for liver disease as listed per GI fellow including immune mediated cholangiopathy, IgG4, await MRI/MRCP, trend liver tests, a HIDA scan if MRCP negative or inconclusive, avoid hepatotoxins and continue care per primary team.

## 2021-04-19 NOTE — PROGRESS NOTE ADULT - REASON FOR ADMISSION
fever, abdominal pain, jaundice

## 2021-04-19 NOTE — DISCHARGE NOTE PROVIDER - NSDCCPTREATMENT_GEN_ALL_CORE_FT
PRINCIPAL PROCEDURE  Procedure: MR MRCP  Findings and Treatment: FINDINGS:  LOWER CHEST: Within normal limits.  LIVER: Within normal limits.  BILE DUCTS: Normal caliber. No evidence of choledocholithiasis. Both the CBD and pancreatic duct are seen to the level of the ampulla.  GALLBLADDER: Tiny layering stones in the neck of the gallbladder (4:13 and 3:20). No wall thickening or pericholecystic signal abnormality.  SPLEEN: Within normal limits.  PANCREAS: Within normal limits.  ADRENALS: Within normal limits.  KIDNEYS/URETERS: Right renal cysts, the largest measuring up to 2.1 cm. 1.5 cm T2 hypointense lesion lower pole the right kidney with mild enhancement, concerning for renal cell carcinoma. No hydronephrosis.  VISUALIZED PORTIONS:  BOWEL: Within normal limits.  PERITONEUM: No ascites.  VESSELS: Within normal limits.  RETROPERITONEUM/LYMPH NODES: No lymphadenopathy.  ABDOMINAL WALL: Within normal limits.  BONES: Within normal limits.  IMPRESSION:  Cholelithiasis with tiny layering stones in the neck of the gallbladder.  Normal caliber bile ducts. No evidence of choledocholithiasis.  Faintly enhancing lesion lower pole of the right kidney measuring 1.5 cm, concerning for renal cell carcinoma. Findings favor papillary subtype.

## 2021-04-19 NOTE — PROGRESS NOTE ADULT - PROBLEM SELECTOR PLAN 5
DVT: none indicated, encourage ambulation  Diet: DASH diet  Full code
DVT: none indicated, encourage ambulation  Diet: NPO with sips of water pending MRCP  Full code
DVT: none indicated, encourage ambulation  Diet: NPO with sips of water pending MRCP  Full code

## 2021-04-19 NOTE — PROGRESS NOTE ADULT - PROBLEM SELECTOR PLAN 2
Elevated LFTs with bilirubin elevation (mostly direct elevation), pointing to obstruction as most likely cause presentation. Possibly from passed stone  - F/u work-up as above Elevated LFTs with bilirubin elevation (mostly direct elevation), pointing to obstruction as most likely cause presentation. Possibly from passed stone given improving LFTs, ALP, bili  - F/u work-up as above

## 2021-04-19 NOTE — PROGRESS NOTE ADULT - SUBJECTIVE AND OBJECTIVE BOX
PROGRESS NOTE:     CONTACT INFO:  Scooby Colbert MD  Internal Medicine PGY1  Pager: [phone]    Patient is a 60y old  Male who presents with a chief complaint of fever, abdominal pain, jaundice (19 Apr 2021 08:02)      SUBJECTIVE / OVERNIGHT EVENTS:  No acute events overnight. No new complaints this AM. States his abdominal pain is much improved. Endorses NB BM yesterday following Miralax. Tolerating PO without n/v. Denies f/c.    MEDICATIONS  (STANDING):  amLODIPine   Tablet 5 milliGRAM(s) Oral daily  polyethylene glycol 3350 17 Gram(s) Oral daily  senna 2 Tablet(s) Oral at bedtime  simethicone 80 milliGRAM(s) Chew three times a day    MEDICATIONS  (PRN):  acetaminophen   Tablet .. 650 milliGRAM(s) Oral every 6 hours PRN Temp greater or equal to 38C (100.4F), Mild Pain (1 - 3), Moderate Pain (4 - 6)  ketorolac   Injectable 15 milliGRAM(s) IV Push every 8 hours PRN Severe Pain (7 - 10)      CAPILLARY BLOOD GLUCOSE        I&O's Summary      PHYSICAL EXAM:  Vital Signs Last 24 Hrs  T(C): 36.4 (19 Apr 2021 05:05), Max: 37.1 (18 Apr 2021 13:38)  T(F): 97.6 (19 Apr 2021 05:05), Max: 98.7 (18 Apr 2021 13:38)  HR: 62 (19 Apr 2021 05:05) (62 - 65)  BP: 119/70 (19 Apr 2021 05:05) (105/62 - 119/74)  BP(mean): --  RR: 18 (19 Apr 2021 05:05) (17 - 18)  SpO2: 96% (19 Apr 2021 05:05) (96% - 98%)    GENERAL: NAD, lying comfortably in bed   HEENT:  Atraumatic, Normocephalic, EOMI, conjunctiva and sclera clear, no LAD  CHEST/LUNG: Clear to auscultation bilaterally; No wheeze  HEART: RRR, S1 and S2 No murmurs, rubs, or gallops  ABDOMEN: Soft, Nontender, Nondistended; Bowel sounds present  EXTREMITIES:  2+ Peripheral Pulses, No clubbing, cyanosis, or edema  NEURO: AAOx3, non-focal  SKIN: No rashes or lesions    LABS:                        13.5   7.24  )-----------( 363      ( 19 Apr 2021 08:12 )             44.4     04-19    142  |  102  |  21  ----------------------------<  97  3.8   |  30  |  0.95    Ca    9.5      19 Apr 2021 08:12  Phos  3.7     04-19  Mg     2.4     04-19    TPro  6.8  /  Alb  3.5  /  TBili  3.2<H>  /  DBili  1.8<H>  /  AST  55<H>  /  ALT  121<H>  /  AlkPhos  183<H>  04-19              Culture - Urine (collected 16 Apr 2021 10:27)  Source: .Urine Clean Catch (Midstream)  Final Report (17 Apr 2021 13:40):    <10,000 CFU/mL Normal Urogenital Yamel        RADIOLOGY & ADDITIONAL TESTS:  Results Reviewed:   Imaging Personally Reviewed:  Electrocardiogram Personally Reviewed:    COORDINATION OF CARE:  Care Discussed with Consultants/Other Providers [Y/N]:  Prior or Outpatient Records Reviewed [Y/N]:

## 2021-04-19 NOTE — DISCHARGE NOTE NURSING/CASE MANAGEMENT/SOCIAL WORK - NSDCFUADDAPPT_GEN_ALL_CORE_FT
Please follow-up with your PCP in 1 week and bring this paper with you to your appointment: [phone]    Please follow-up with GI (Dr. Stewart) within 1 week for further monitoring of your condition: [phone]    Please call the urology clinic for an appointment within 1 month: [phone]

## 2021-04-19 NOTE — DISCHARGE NOTE PROVIDER - NSDCFUADDAPPT_GEN_ALL_CORE_FT
Please follow-up with your PCP in 1 week and bring this paper with you to your appointment: [phone]    Please follow-up with GI (Dr. Stewart) within 1 week for further monitoring of your condition: [phone]    Please call the urology clinic for an appointment within 1 month: [phone] Please follow-up with your PCP in 1 week and bring this paper with you to your appointment: [phone]    Please follow-up with GI (Dr. Stewart) within 1 week for further monitoring of your condition and bring this paper with you: [phone]    Please call the urology clinic for an appointment within 1 month and bring this paper with you: [phone]

## 2021-04-19 NOTE — DISCHARGE NOTE PROVIDER - CARE PROVIDER_API CALL
Tania Nj)  Family Medicine  [address] [place] [address]  Phone: [phone]  Established Patient  Follow Up Time: 1 week    Alisson Stewart)  Gastroenterology; Internal Medicine  [address] [place] [address]  Phone: [phone]  Fax: [phone]  Follow Up Time: 1 week

## 2021-04-19 NOTE — DISCHARGE NOTE NURSING/CASE MANAGEMENT/SOCIAL WORK - PATIENT PORTAL LINK FT
You can access the FollowMyHealth Patient Portal offered by [facility] by registering at the following website: [email or web address]. By joining [facility]’s FollowMyHealth portal, you will also be able to view your health information using other applications (apps) compatible with our system.

## 2021-04-19 NOTE — PROGRESS NOTE ADULT - ATTENDING COMMENTS
Pt seen at bedside, feeling well, about to go down for MRI.   Overall, 60M w/obesity s/p gastric sleeve, HTN, cervical fusion, appendectomy p/w recent fevers, abdominal pain and jaundice, labs w/conjugated hyperbilirubinemia likely obstructive, seen by hepatology.   MRCP done, pending read. IF not acute findings and cleared by GI, can go home today w/out pt GI followup.    ~40 minutes Pt seen at bedside, feeling well, about to go down for MRI.   Overall, 60M w/obesity s/p gastric sleeve, HTN, cervical fusion, appendectomy p/w recent fevers, abdominal pain and jaundice, labs w/conjugated hyperbilirubinemia likely obstructive, seen by hepatology.   MRCP done, pending read. IF no acute findings and cleared by GI, can go home today w/out pt GI followup.    ~40 minutes

## 2021-04-19 NOTE — DISCHARGE NOTE PROVIDER - NSFOLLOWUPCLINICS_GEN_ALL_ED_FT
[facility]  Urology  [address] [place] [address] - [address] [place] [address]  Phone: [phone]  Fax:   Follow Up Time: 1 month

## 2021-04-19 NOTE — PROGRESS NOTE ADULT - ASSESSMENT
59 y/o M PMHx gastric sleeve (2017), HTN, cervical fusion, appendectomy p/w fevers, malaise and abdominal pain x 6 days - found to have elevated transaminases and bilirubin with unremarkable CT.     Impression:  #Elevated transaminases with elevated bilirubin - mixed pattern with R factor 2.2; no biliary obstruction/enhancement or abnormality on imaging, no cholelithiasis although could be passed stone. Liver also appears normal on imaging. Normal INR and platelets. Differential includes autoimmune disease such as PSC rakesh given microcytosis, viral hepatitis, DILI, cholestasis due to sepsis, DILI, passed stone.   -hep panel negative, AMA neg, ASMA neg  - ANCA, antiLKM, ROSA M, CMV PCR, hep E, Ig pending      Recommendation:  - f/u MRI/MRCP  - f/u rest of w/u: ANCA, antiLKM, ROSA M, CMV PCR, hep E, Ig  - f/u blood cx  - if microcytosis is new, may need repeat EGD/colonoscopy for evaluation of IBD/malignancy  - f/u in Hepatology office after MRI completed ([address] [place] [address]; [phone])  - rest of care per primary team      Rosa Shearer PGY-4  Gastroenterology Fellow  Pager #[phone] (MAXIMILIAN) or [phone] (NS)  Available on [employer] Teams.  Please contact on-call GI fellow via answering service ([phone]) after 5pm and before 8am, and on weekends. 59 y/o M PMHx gastric sleeve (2017), HTN, cervical fusion, appendectomy p/w fevers, malaise and abdominal pain x 6 days - found to have elevated transaminases and bilirubin with unremarkable CT.     Impression:  #Elevated transaminases with elevated bilirubin - mixed pattern with R factor 2.2; no biliary obstruction/enhancement or abnormality on imaging, no cholelithiasis although could be passed stone. Liver also appears normal on imaging. Normal INR and platelets. Differential includes autoimmune disease such as PSC rakesh given microcytosis, viral hepatitis, DILI, cholestasis due to sepsis, DILI, passed stone.   -hep panel negative, AMA neg, ASMA neg  - ANCA, antiLKM, ROSA M, CMV PCR, hep E, Ig pending      Recommendation:  - f/u MRI/MRCP  - f/u rest of w/u: ANCA, antiLKM, ROSA M, CMV PCR, hep E, Ig  - f/u blood cx  - if microcytosis is new, may need repeat EGD/colonoscopy for evaluation of IBD/malignancy  - f/u in Hepatology office after MRI completed ([address] [place] [address]; [phone])  - rest of care per primary team    Addenum: Added IgG subset, IgA, IgM and ANCA.       Rosa Shearer PGY-4  Gastroenterology Fellow  Pager #[phone] (MAXIM[facility]IAN) or [phone] (NS)  Available on [employer] Teams.  Please contact on-call GI fellow via answering service ([phone]) after 5pm and before 8am, and on weekends.

## 2021-04-19 NOTE — DISCHARGE NOTE PROVIDER - PROVIDER TOKENS
PROVIDER:[TOKEN:[[id number]],FOLLOWUP:[1 week],ESTABLISHEDPATIENT:[T]],PROVIDER:[TOKEN:[[id number]],FOLLOWUP:[1 week]]

## 2021-04-19 NOTE — DISCHARGE NOTE PROVIDER - HOSPITAL COURSE
61 y/o M PMHx gastric sleeve (2017), HTN, cervical fusion, appendectomy p/w fevers, malaise and abdominal pain x 6 days.  Pt reports developing gradual onset malaise, fever Tmax 101F and gradual onset generalized abdominal pain, described as sharp pain, and was constant, lasting for 3 days, then resolving on its own.  Pt reports fevers resolved for past 3 days.  He also reports constant bilateral back pain for past 6 days, which worsens with movement.  Pt reports going to urgent care 5 days ago, had covid swab which was negative.  Pt states today began to develop lightheadedness upon standing up and using the restroom.  Pt reports he was seen by PMD today where he had urine evaluated and was told he had blood in urine for which he was directed to ED for evaluation. He reports 25-30 years ago he had an episode of jaundice in [place] where he was given some medication, but cannot recall many details of this. Pt otherwise denies any chest pain, SOB, cough, nausea, vomiting, diarrhea, constipation, dysuria, cloudy urine, trauma, falls, numbness, focal weakness, illicit drug use, h/o cancer.      Hospital Course    Pt admitted for further work-up of abd pain and jaundice. Pt had spontaneous improvement in abd pain, LFTs, ALP, and bilirubin to near normal levels. MRCP performed inpatient showed "Cholelithiasis with tiny layering stones in the neck of the gallbladder. Normal caliber bile ducts. No evidence of choledocholithiasis. Faintly enhancing lesion lower pole of the right kidney measuring 1.5 cm, concerning for renal cell carcinoma. Findings favor papillary subtype." Per GI recs ____ 61 y/o M PMHx gastric sleeve (2017), HTN, cervical fusion, appendectomy p/w fevers, malaise and abdominal pain x 6 days.  Pt reports developing gradual onset malaise, fever Tmax 101F and gradual onset generalized abdominal pain, described as sharp pain, and was constant, lasting for 3 days, then resolving on its own.  Pt reports fevers resolved for past 3 days.  He also reports constant bilateral back pain for past 6 days, which worsens with movement.  Pt reports going to urgent care 5 days ago, had covid swab which was negative.  Pt states today began to develop lightheadedness upon standing up and using the restroom.  Pt reports he was seen by PMD today where he had urine evaluated and was told he had blood in urine for which he was directed to ED for evaluation. He reports 25-30 years ago he had an episode of jaundice in [place] where he was given some medication, but cannot recall many details of this. Pt otherwise denies any chest pain, SOB, cough, nausea, vomiting, diarrhea, constipation, dysuria, cloudy urine, trauma, falls, numbness, focal weakness, illicit drug use, h/o cancer.      Hospital Course    Pt admitted for further work-up of abd pain and jaundice. Pt had spontaneous improvement in abd pain, LFTs, ALP, and bilirubin to near normal levels. MRCP performed inpatient showed "Cholelithiasis with tiny layering stones in the neck of the gallbladder. Normal caliber bile ducts. No evidence of choledocholithiasis. Faintly enhancing lesion lower pole of the right kidney measuring 1.5 cm, concerning for renal cell carcinoma. Findings favor papillary subtype." Per GI recs, pt was discharged with plan for GI follow-up and urology follow-up for the incidental renal lesion. 61 y/o M PMHx gastric sleeve (2017), HTN, cervical fusion, appendectomy p/w fevers, malaise and abdominal pain x 6 days.  Pt reports developing gradual onset malaise, fever Tmax 101F and gradual onset generalized abdominal pain, described as sharp pain, and was constant, lasting for 3 days, then resolving on its own.  Pt reports fevers resolved for past 3 days.  He also reports constant bilateral back pain for past 6 days, which worsens with movement.  Pt reports going to urgent care 5 days ago, had covid swab which was negative.  Pt states today began to develop lightheadedness upon standing up and using the restroom.  Pt reports he was seen by PMD today where he had urine evaluated and was told he had blood in urine for which he was directed to ED for evaluation. He reports 25-30 years ago he had an episode of jaundice in [place] where he was given some medication, but cannot recall many details of this. Pt otherwise denies any chest pain, SOB, cough, nausea, vomiting, diarrhea, constipation, dysuria, cloudy urine, trauma, falls, numbness, focal weakness, illicit drug use, h/o cancer.      Hospital Course    Pt admitted for further work-up of abd pain and jaundice. Pt had spontaneous improvement in abd pain, LFTs, ALP, and bilirubin to near normal levels. MRCP performed inpatient showed "Cholelithiasis with tiny layering stones in the neck of the gallbladder. Normal caliber bile ducts. No evidence of choledocholithiasis. Faintly enhancing lesion lower pole of the right kidney measuring 1.5 cm, concerning for renal cell carcinoma. Findings favor papillary subtype." Per GI recs, pt was discharged with plan for GI follow-up and urology follow-up for the incidental renal lesion. No new medications were started. Pt medically stable for discharge.

## 2021-04-19 NOTE — PROGRESS NOTE ADULT - PROBLEM SELECTOR PLAN 1
Presents with gradual onset generalized abd pain i/s/o recent fever to 101, associated with b/l back pain and jaundice. Found to have blood in the urine by PCP. Labs notable for , , , Bili 4.5 (mostly direct), INR wnl, CK wnl, lipase wnl. CT without acute pathology. Currently not meeting criteria for cholangitis based on [place] guidelines. DDx includes choledocholithiasis vs. passed stone vs. viral (although only mild LFT and negative hep panel) vs. autoimmune vs. post-surgical complication  - F/u MRCP  - GI consult for possible ERCP pending results of MRCP  - Hep panel negative  - Tox screen negative  - F/u autoimmune panel  - F/u EBV, CMV  - Tylenol for mild pain, Toradol for severe pain Presents with gradual onset generalized abd pain i/s/o recent fever to 101, associated with b/l back pain and jaundice. Found to have blood in the urine by PCP. Labs notable for , , , Bili 4.5 (mostly direct), INR wnl, CK wnl, lipase wnl. CT without acute pathology. Currently not meeting criteria for cholangitis based on [place] guidelines. DDx includes choledocholithiasis vs. passed stone vs. viral (although only mild LFT and negative hep panel) vs. autoimmune vs. post-surgical complication  - F/u MRCP  - Hepatology following  - Hep panel negative  - Tox screen negative  - F/u autoimmune panel  - Tylenol for mild pain, Toradol for severe pain

## 2021-04-19 NOTE — PROGRESS NOTE ADULT - PROBLEM SELECTOR PLAN 4
Continue amlodipine 5 mg with hold parameters  check orthostatics Continue amlodipine 5 mg with hold parameters

## 2021-04-19 NOTE — DISCHARGE NOTE PROVIDER - CARE PROVIDERS DIRECT ADDRESSES
,DirectAddress_Unknown,[email or web address]
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
## 2021-04-20 LAB
AUTO DIFF PNL BLD: NEGATIVE — SIGNIFICANT CHANGE UP
AUTO DIFF PNL BLD: NEGATIVE — SIGNIFICANT CHANGE UP
C-ANCA SER-ACNC: NEGATIVE — SIGNIFICANT CHANGE UP
C-ANCA SER-ACNC: NEGATIVE — SIGNIFICANT CHANGE UP
P-ANCA SER-ACNC: NEGATIVE — SIGNIFICANT CHANGE UP
P-ANCA SER-ACNC: NEGATIVE — SIGNIFICANT CHANGE UP

## 2021-04-21 LAB
CMV DNA CSF QL NAA+PROBE: SIGNIFICANT CHANGE UP
COPPER SERPL-MCNC: 111 UG/DL — SIGNIFICANT CHANGE UP (ref 69–132)
IGG SERPL-MCNC: 1109 MG/DL — SIGNIFICANT CHANGE UP (ref 603–1613)
IGG1 SER-MCNC: 189 MG/DL — LOW (ref 248–810)
IGG2 SER-MCNC: 304 MG/DL — SIGNIFICANT CHANGE UP (ref 130–555)
IGG3 SER-MCNC: 38 MG/DL — SIGNIFICANT CHANGE UP (ref 15–102)
IGG4 SER-MCNC: 411 MG/DL — HIGH (ref 2–96)

## 2021-04-23 LAB
CULTURE RESULTS: SIGNIFICANT CHANGE UP
CULTURE RESULTS: SIGNIFICANT CHANGE UP
SPECIMEN SOURCE: SIGNIFICANT CHANGE UP
SPECIMEN SOURCE: SIGNIFICANT CHANGE UP

## 2021-04-24 LAB — HEV IGM SER QL: SIGNIFICANT CHANGE UP

## 2021-05-18 ENCOUNTER — NON-APPOINTMENT (OUTPATIENT)
Age: 61
End: 2021-05-18

## 2021-05-18 PROBLEM — M10.9 GOUT, UNSPECIFIED: Chronic | Status: ACTIVE | Noted: 2017-07-27

## 2021-05-19 ENCOUNTER — APPOINTMENT (OUTPATIENT)
Dept: UROLOGY | Facility: CLINIC | Age: 61
End: 2021-05-19
Payer: COMMERCIAL

## 2021-05-19 ENCOUNTER — NON-APPOINTMENT (OUTPATIENT)
Age: 61
End: 2021-05-19

## 2021-05-19 VITALS
OXYGEN SATURATION: 96 % | SYSTOLIC BLOOD PRESSURE: 146 MMHG | BODY MASS INDEX: 31.4 KG/M2 | RESPIRATION RATE: 16 BRPM | HEIGHT: 69 IN | TEMPERATURE: 97.1 F | HEART RATE: 59 BPM | DIASTOLIC BLOOD PRESSURE: 94 MMHG | WEIGHT: 212 LBS

## 2021-05-19 PROCEDURE — 99204 OFFICE O/P NEW MOD 45 MIN: CPT

## 2021-05-19 NOTE — PHYSICAL EXAM
[General Appearance - Well Developed] : well developed [General Appearance - Well Nourished] : well nourished [Normal Appearance] : normal appearance [Well Groomed] : well groomed [General Appearance - In No Acute Distress] : no acute distress [Edema] : no peripheral edema [Exaggerated Use Of Accessory Muscles For Inspiration] : no accessory muscle use [Respiration, Rhythm And Depth] : normal respiratory rhythm and effort [Abdomen Soft] : soft [Abdomen Tenderness] : non-tender [Costovertebral Angle Tenderness] : no ~M costovertebral angle tenderness [Urethral Meatus] : meatus normal [Penis Abnormality] : normal circumcised penis [Urinary Bladder Findings] : the bladder was normal on palpation [Scrotum] : the scrotum was normal [Testes Tenderness] : no tenderness of the testes [Testes Mass (___cm)] : there were no testicular masses [Prostate Tenderness] : the prostate was not tender [No Prostate Nodules] : no prostate nodules [Prostate Size ___ gm] : prostate size [unfilled] gm [Normal Station and Gait] : the gait and station were normal for the patient's age [] : no rash [No Focal Deficits] : no focal deficits [Oriented To Time, Place, And Person] : oriented to person, place, and time [Affect] : the affect was normal [Mood] : the mood was normal [Not Anxious] : not anxious [No Palpable Adenopathy] : no palpable adenopathy

## 2021-05-20 ENCOUNTER — OUTPATIENT (OUTPATIENT)
Dept: OUTPATIENT SERVICES | Facility: HOSPITAL | Age: 61
LOS: 1 days | End: 2021-05-20
Payer: COMMERCIAL

## 2021-05-20 VITALS
WEIGHT: 212.08 LBS | HEART RATE: 76 BPM | TEMPERATURE: 98 F | SYSTOLIC BLOOD PRESSURE: 131 MMHG | DIASTOLIC BLOOD PRESSURE: 83 MMHG | HEIGHT: 69 IN | OXYGEN SATURATION: 96 % | RESPIRATION RATE: 14 BRPM

## 2021-05-20 DIAGNOSIS — Z90.49 ACQUIRED ABSENCE OF OTHER SPECIFIED PARTS OF DIGESTIVE TRACT: Chronic | ICD-10-CM

## 2021-05-20 DIAGNOSIS — Z01.818 ENCOUNTER FOR OTHER PREPROCEDURAL EXAMINATION: ICD-10-CM

## 2021-05-20 DIAGNOSIS — K80.20 CALCULUS OF GALLBLADDER WITHOUT CHOLECYSTITIS WITHOUT OBSTRUCTION: ICD-10-CM

## 2021-05-20 DIAGNOSIS — K83.1 OBSTRUCTION OF BILE DUCT: ICD-10-CM

## 2021-05-20 DIAGNOSIS — Z98.1 ARTHRODESIS STATUS: Chronic | ICD-10-CM

## 2021-05-20 LAB
ALBUMIN SERPL ELPH-MCNC: 4.1 G/DL — SIGNIFICANT CHANGE UP (ref 3.3–5)
ALP SERPL-CCNC: 92 U/L — SIGNIFICANT CHANGE UP (ref 40–120)
ALT FLD-CCNC: 25 U/L — SIGNIFICANT CHANGE UP (ref 10–45)
ANION GAP SERPL CALC-SCNC: 15 MMOL/L — SIGNIFICANT CHANGE UP (ref 5–17)
AST SERPL-CCNC: 16 U/L — SIGNIFICANT CHANGE UP (ref 10–40)
BILIRUB SERPL-MCNC: 0.7 MG/DL — SIGNIFICANT CHANGE UP (ref 0.2–1.2)
BUN SERPL-MCNC: 19 MG/DL — SIGNIFICANT CHANGE UP (ref 7–23)
CALCIUM SERPL-MCNC: 9.9 MG/DL — SIGNIFICANT CHANGE UP (ref 8.4–10.5)
CHLORIDE SERPL-SCNC: 105 MMOL/L — SIGNIFICANT CHANGE UP (ref 96–108)
CO2 SERPL-SCNC: 21 MMOL/L — LOW (ref 22–31)
CREAT SERPL-MCNC: 0.78 MG/DL — SIGNIFICANT CHANGE UP (ref 0.5–1.3)
GLUCOSE SERPL-MCNC: 108 MG/DL — HIGH (ref 70–99)
HCT VFR BLD CALC: 41.8 % — SIGNIFICANT CHANGE UP (ref 39–50)
HGB BLD-MCNC: 12.9 G/DL — LOW (ref 13–17)
MCHC RBC-ENTMCNC: 24.2 PG — LOW (ref 27–34)
MCHC RBC-ENTMCNC: 30.9 GM/DL — LOW (ref 32–36)
MCV RBC AUTO: 78.4 FL — LOW (ref 80–100)
NRBC # BLD: 0 /100 WBCS — SIGNIFICANT CHANGE UP (ref 0–0)
PLATELET # BLD AUTO: 267 K/UL — SIGNIFICANT CHANGE UP (ref 150–400)
POTASSIUM SERPL-MCNC: 3.7 MMOL/L — SIGNIFICANT CHANGE UP (ref 3.5–5.3)
POTASSIUM SERPL-SCNC: 3.7 MMOL/L — SIGNIFICANT CHANGE UP (ref 3.5–5.3)
PROT SERPL-MCNC: 7.2 G/DL — SIGNIFICANT CHANGE UP (ref 6–8.3)
RBC # BLD: 5.33 M/UL — SIGNIFICANT CHANGE UP (ref 4.2–5.8)
RBC # FLD: 14.9 % — HIGH (ref 10.3–14.5)
SODIUM SERPL-SCNC: 141 MMOL/L — SIGNIFICANT CHANGE UP (ref 135–145)
WBC # BLD: 5.97 K/UL — SIGNIFICANT CHANGE UP (ref 3.8–10.5)
WBC # FLD AUTO: 5.97 K/UL — SIGNIFICANT CHANGE UP (ref 3.8–10.5)

## 2021-05-20 PROCEDURE — 80053 COMPREHEN METABOLIC PANEL: CPT

## 2021-05-20 PROCEDURE — G0463: CPT

## 2021-05-20 PROCEDURE — 85027 COMPLETE CBC AUTOMATED: CPT

## 2021-05-20 RX ORDER — LIDOCAINE HCL 20 MG/ML
0.2 VIAL (ML) INJECTION ONCE
Refills: 0 | Status: DISCONTINUED | OUTPATIENT
Start: 2021-06-01 | End: 2021-06-15

## 2021-05-20 RX ORDER — SODIUM CHLORIDE 9 MG/ML
3 INJECTION INTRAMUSCULAR; INTRAVENOUS; SUBCUTANEOUS EVERY 8 HOURS
Refills: 0 | Status: DISCONTINUED | OUTPATIENT
Start: 2021-06-01 | End: 2021-06-15

## 2021-05-20 RX ORDER — CHLORHEXIDINE GLUCONATE 213 G/1000ML
1 SOLUTION TOPICAL ONCE
Refills: 0 | Status: DISCONTINUED | OUTPATIENT
Start: 2021-06-01 | End: 2021-06-15

## 2021-05-20 NOTE — H&P PST ADULT - HEALTH CARE MAINTENANCE
Flu vaccine-ot in 2020  COVID vaccine, Moderna, completed 3/2021  Colonoscopy/Endoscopy before bariatric surgery

## 2021-05-20 NOTE — H&P PST ADULT - NSICDXPROBLEM_GEN_ALL_CORE_FT
PROBLEM DIAGNOSES  Problem: Calculus of gallbladder w/o mention of cholecystitis or obstruction  Assessment and Plan: Scheduled for laaroscopic cholecystectomy, possible introp cholangiogram.  Preop instructions given.  CBC, CMP awaiting results.  COVID testing   Will call for PCP note from 5/5.  Chlorhexidine to affected area preop.
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
## 2021-05-20 NOTE — H&P PST ADULT - NECK DETAILS
Limited ROM greatest with extension, mostly limited with flexion, rotation to right/left (had anterior and posterior cervical spine surgery)

## 2021-05-23 RX ORDER — TRAMADOL HYDROCHLORIDE 50 MG/1
50 TABLET, COATED ORAL
Qty: 20 | Refills: 0 | Status: COMPLETED | COMMUNITY
Start: 2021-04-20

## 2021-05-23 RX ORDER — AMLODIPINE BESYLATE 5 MG/1
5 TABLET ORAL
Qty: 90 | Refills: 0 | Status: COMPLETED | COMMUNITY
Start: 2021-02-10

## 2021-05-23 NOTE — HISTORY OF PRESENT ILLNESS
[FreeTextEntry1] : 61 yo M recently hospitalized for jaundice, fever and abdominal pain\par Went to PCP who directed him to ER\par Workup revealed cholelithiasis and pt is pending cholecystectomy\par MRCP also showed a slightly enhancing right renal mass 1.5cm\par no bothersome LUTS usually but does have some urinary hesitancy\par no dysuria but does have history of microhematuria\par Drinks 3-4 large cups of water, 1-2 cups of coffee daily\par no prior prostate problems

## 2021-05-23 NOTE — ASSESSMENT
[FreeTextEntry1] : 59 yo M with right renal mass 1.5cm\par \par - PVR = 28ml\par - Reviewed records from recent hospitalization including CT and MRCP and confirmed findings as stated above\par - Discussed options including active surveillance vs ablation vs surgical extirpation and the pros and cons of each option. Given size, location and how subtle the lesion is on the MRCP, would recommend active surveillance at this time.\par - Repeat MRI in 3 months but with renal mass protocol\par - FU in 3 months after MRI\par - UA today

## 2021-05-24 LAB
APPEARANCE: CLEAR
BACTERIA UR CULT: NORMAL
BACTERIA: NEGATIVE
BILIRUBIN URINE: NEGATIVE
BLOOD URINE: NEGATIVE
CALCIUM OXALATE CRYSTALS: ABNORMAL
COLOR: YELLOW
GLUCOSE QUALITATIVE U: NEGATIVE
HYALINE CASTS: 1 /LPF
KETONES URINE: NORMAL
LEUKOCYTE ESTERASE URINE: NEGATIVE
MICROSCOPIC-UA: NORMAL
NITRITE URINE: NEGATIVE
PH URINE: 5.5
PROTEIN URINE: NORMAL
RED BLOOD CELLS URINE: 2 /HPF
SPECIFIC GRAVITY URINE: 1.03
SQUAMOUS EPITHELIAL CELLS: 0 /HPF
UROBILINOGEN URINE: NORMAL
WHITE BLOOD CELLS URINE: 2 /HPF

## 2021-05-29 ENCOUNTER — OUTPATIENT (OUTPATIENT)
Dept: OUTPATIENT SERVICES | Facility: HOSPITAL | Age: 61
LOS: 1 days | End: 2021-05-29
Payer: COMMERCIAL

## 2021-05-29 DIAGNOSIS — Z98.1 ARTHRODESIS STATUS: Chronic | ICD-10-CM

## 2021-05-29 DIAGNOSIS — Z11.52 ENCOUNTER FOR SCREENING FOR COVID-19: ICD-10-CM

## 2021-05-29 DIAGNOSIS — Z90.49 ACQUIRED ABSENCE OF OTHER SPECIFIED PARTS OF DIGESTIVE TRACT: Chronic | ICD-10-CM

## 2021-05-29 PROCEDURE — U0003: CPT

## 2021-05-29 PROCEDURE — U0005: CPT

## 2021-05-29 PROCEDURE — C9803: CPT

## 2021-05-30 LAB — SARS-COV-2 RNA SPEC QL NAA+PROBE: SIGNIFICANT CHANGE UP

## 2021-05-31 ENCOUNTER — TRANSCRIPTION ENCOUNTER (OUTPATIENT)
Age: 61
End: 2021-05-31

## 2021-05-31 RX ORDER — OXYCODONE HYDROCHLORIDE 5 MG/1
5 TABLET ORAL ONCE
Refills: 0 | Status: DISCONTINUED | OUTPATIENT
Start: 2021-06-01 | End: 2021-06-01

## 2021-05-31 RX ORDER — ONDANSETRON 8 MG/1
4 TABLET, FILM COATED ORAL ONCE
Refills: 0 | Status: COMPLETED | OUTPATIENT
Start: 2021-06-01 | End: 2021-06-01

## 2021-05-31 RX ORDER — SODIUM CHLORIDE 9 MG/ML
1000 INJECTION, SOLUTION INTRAVENOUS
Refills: 0 | Status: DISCONTINUED | OUTPATIENT
Start: 2021-06-01 | End: 2021-06-15

## 2021-06-01 ENCOUNTER — OUTPATIENT (OUTPATIENT)
Dept: OUTPATIENT SERVICES | Facility: HOSPITAL | Age: 61
LOS: 1 days | End: 2021-06-01
Payer: COMMERCIAL

## 2021-06-01 ENCOUNTER — RESULT REVIEW (OUTPATIENT)
Age: 61
End: 2021-06-01

## 2021-06-01 VITALS
WEIGHT: 212.08 LBS | HEART RATE: 65 BPM | DIASTOLIC BLOOD PRESSURE: 98 MMHG | TEMPERATURE: 98 F | SYSTOLIC BLOOD PRESSURE: 157 MMHG | RESPIRATION RATE: 16 BRPM | OXYGEN SATURATION: 98 % | HEIGHT: 69 IN

## 2021-06-01 VITALS
SYSTOLIC BLOOD PRESSURE: 119 MMHG | TEMPERATURE: 97 F | DIASTOLIC BLOOD PRESSURE: 68 MMHG | HEART RATE: 58 BPM | RESPIRATION RATE: 18 BRPM | OXYGEN SATURATION: 95 %

## 2021-06-01 DIAGNOSIS — Z01.818 ENCOUNTER FOR OTHER PREPROCEDURAL EXAMINATION: ICD-10-CM

## 2021-06-01 DIAGNOSIS — K83.1 OBSTRUCTION OF BILE DUCT: ICD-10-CM

## 2021-06-01 DIAGNOSIS — K80.20 CALCULUS OF GALLBLADDER WITHOUT CHOLECYSTITIS WITHOUT OBSTRUCTION: ICD-10-CM

## 2021-06-01 DIAGNOSIS — Z90.49 ACQUIRED ABSENCE OF OTHER SPECIFIED PARTS OF DIGESTIVE TRACT: Chronic | ICD-10-CM

## 2021-06-01 DIAGNOSIS — Z98.1 ARTHRODESIS STATUS: Chronic | ICD-10-CM

## 2021-06-01 PROCEDURE — C9399: CPT

## 2021-06-01 PROCEDURE — 88304 TISSUE EXAM BY PATHOLOGIST: CPT

## 2021-06-01 PROCEDURE — 88304 TISSUE EXAM BY PATHOLOGIST: CPT | Mod: 26

## 2021-06-01 PROCEDURE — C1889: CPT

## 2021-06-01 PROCEDURE — 47562 LAPAROSCOPIC CHOLECYSTECTOMY: CPT

## 2021-06-01 RX ORDER — OXYCODONE HYDROCHLORIDE 5 MG/1
1 TABLET ORAL
Qty: 5 | Refills: 0
Start: 2021-06-01

## 2021-06-01 RX ORDER — VANCOMYCIN HCL 1 G
1500 VIAL (EA) INTRAVENOUS ONCE
Refills: 0 | Status: COMPLETED | OUTPATIENT
Start: 2021-06-01 | End: 2021-06-01

## 2021-06-01 RX ADMIN — ONDANSETRON 4 MILLIGRAM(S): 8 TABLET, FILM COATED ORAL at 13:59

## 2021-06-01 RX ADMIN — OXYCODONE HYDROCHLORIDE 5 MILLIGRAM(S): 5 TABLET ORAL at 13:59

## 2021-06-01 NOTE — ASU DISCHARGE PLAN (ADULT/PEDIATRIC) - NURSING INSTRUCTIONS
Tylenol/acetaminophen------AND/OR------Motrin/ibuprofen  as needed for pain/discomfort.   NEXT DOSE:   Tylenol  OK @  5:00pm this afternoon, if needed.  Follow up with MD as requested; call office for appointment.

## 2021-06-01 NOTE — ASU DISCHARGE PLAN (ADULT/PEDIATRIC) - ASU DC SPECIAL INSTRUCTIONSFT
PAIN: You may continue to take Acetaminophen (Tylenol) and Ibuprofen (Advil, Motrin) over the counter for pain as needed. You can alternate the two medications, giving one every 3 hours. For pain not relieved by these medications, a short script for oxycodone was sent to your pharmacy.

## 2021-06-01 NOTE — BRIEF OPERATIVE NOTE - OPERATION/FINDINGS
Laparoscopic Cholecystectomy performed via 4-port incision.   Critical View obtained.   Cystic Duct and Cystic artery clipped.  Incisions closed with 4-0 sutures, covered with steri-strips, dressed with OPSITES

## 2021-06-01 NOTE — PRE-ANESTHESIA EVALUATION ADULT - NSATTENDATTESTRD_GEN_ALL_CORE
Statement Selected The patient has been re-examined and I agree with the above assessment or I updated with my findings.
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
## 2021-06-01 NOTE — ASU DISCHARGE PLAN (ADULT/PEDIATRIC) - CALL YOUR DOCTOR IF YOU HAVE ANY OF THE FOLLOWING:
Bleeding that does not stop/Swelling that gets worse/Pain not relieved by Medications/Fever greater than (need to indicate Fahrenheit or Celsius)/Wound/Surgical Site with redness, or foul smelling discharge or pus/Numbness, tingling, color or temperature change to extremity/Nausea and vomiting that does not stop yes

## 2021-06-01 NOTE — PRE-ANESTHESIA EVALUATION ADULT - MALLAMPATI CLASS
Large chip in right upper central incisor/Class IV (difficult) - the soft palate is not visible at all

## 2021-06-01 NOTE — ASU PATIENT PROFILE, ADULT - TEACHING/LEARNING FACTORS IMPACT ABILITY TO LEARN
none
I personally saw the patient with the PA, and completed the key components of the history and physical exam. I then discussed the management plan with the PA.      Pt states neck pain started in the morning and she feels it on the left side; parents noted that head was tilted. THey deny any recent trauma, fever, chills, sore throat, vomiting, dizziness. Pt not c/o abdominal pain at this time. Denies any sore throat or trouble swallowing. Having normal BM per parents.    On exam pt in no distress, well appearing  Head very slightly tilted towards R with chin towards L with mild TTP over L SCM  Pt has slight limitation in lateral rotation of neck towards L  FUll Extension and flexion of neck  No posterior/ midline cervical spinal tenderness.  No cervical or supraclavicular LAD palpable  No masses/ fluctuance or swelling  Normal posterior oropharynx; erythema; uvula midline ; no stridor or drooling  Lung sCTA b/l   RRR nl S1S2 no MRG  CN II-XII intact; central and peripheral vision intact; normal finger-nose/ heel-shin; 5/5 strength to b/l UE, LE; no pronator drift to b/l UE, LE. Intact and equal sensation to face, extremities; Normal gait; no ataxia;      Pt notes improvement in pain with decreased head tilt after motrin; pending c spine x rays/ reeval

## 2021-06-01 NOTE — ASU DISCHARGE PLAN (ADULT/PEDIATRIC) - CARE PROVIDER_API CALL
Cliff Colin)  Surgery  [address] [facility] [place] [address]  [place] [address]  Phone: [phone]  Fax: [phone]  Established Patient  Follow Up Time: 2 weeks

## 2021-06-01 NOTE — ASU PREOP CHECKLIST - AS BP NONINV METHOD
electronic Elidel Counseling: Patient may experience a mild burning sensation during topical application. Elidel is not approved in children less than 2 years of age. There have been case reports of hematologic and skin malignancies in patients using topical calcineurin inhibitors although causality is questionable.

## 2021-06-01 NOTE — PRE-ANESTHESIA EVALUATION ADULT - CONTACT INFO FOR SLEEP STUDY
Advance Directives: Care Instructions  Overview  An advance directive is a legal way to state your wishes at the end of your life. It tells your family and your doctor what to do if you can't say what you want. There are two main types of advance directives. You can change them any time your wishes change. Living will. This form tells your family and your doctor your wishes about life support and other treatment. The form is also called a declaration. Medical power of . This form lets you name a person to make treatment decisions for you when you can't speak for yourself. This person is called a health care agent (health care proxy, health care surrogate). The form is also called a durable power of  for health care. If you do not have an advance directive, decisions about your medical care may be made by a family member, or by a doctor or a  who doesn't know you. It may help to think of an advance directive as a gift to the people who care for you. If you have one, they won't have to make tough decisions by themselves. Follow-up care is a key part of your treatment and safety. Be sure to make and go to all appointments, and call your doctor if you are having problems. It's also a good idea to know your test results and keep a list of the medicines you take. What should you include in an advance directive? Many states have a unique advance directive form. (It may ask you to address specific issues.) Or you might use a universal form that's approved by many states. If your form doesn't tell you what to address, it may be hard to know what to include in your advance directive. Use the questions below to help you get started. · Who do you want to make decisions about your medical care if you are not able to? · What life-support measures do you want if you have a serious illness that gets worse over time or can't be cured? · What are you most afraid of that might happen? (Maybe you're afraid of having pain, losing your independence, or being kept alive by machines.)  · Where would you prefer to die? (Your home? A hospital? A nursing home?)  · Do you want to donate your organs when you die? · Do you want certain Tenriism practices performed before you die? When should you call for help? Be sure to contact your doctor if you have any questions. Where can you learn more? Go to https://chpepiceweb.SciQuest. org and sign in to your [employer] account. Enter R264 in the JotSpot box to learn more about \"Advance Directives: Care Instructions. \"     If you do not have an account, please click on the \"Sign Up Now\" link. Current as of: December 9, 2019               Content Version: 12.5  © 3475-0119 Healthwise, Incorporated. Care instructions adapted under license by [facility] ([facility]). If you have questions about a medical condition or this instruction, always ask your healthcare professional. [place] [address] any warranty or liability for your use of this information. Learning About Medical Power of   What is a medical power of ? A medical power of , also called a durable power of  for health care, is one type of the legal forms called advance directives. It lets you name the person you want to make treatment decisions for you if you can't speak or decide for yourself. The person you choose is called your health care agent. This person is also called a health care proxy or health care surrogate. A medical power of  may be called something else in your state. How do you choose a health care agent? Choose your health care agent carefully. This person may or may not be a family member. Talk to the person before you make your final decision. Make sure he or she is comfortable with this responsibility. It's a good idea to choose someone who:  · Is at least 25years old.   · Knows you well and pt doesn't recall name of pulmonary understands what makes life meaningful for you. · Understands your Yazidism and moral values. · Will do what you want, not what he or she wants. · Will be able to make difficult choices at a stressful time. · Will be able to refuse or stop treatment, if that is what you would want, even if you could die. · Will be firm and confident with health professionals if needed. · Will ask questions to get needed information. · Lives near you or agrees to travel to you if needed. Your family may help you make medical decisions while you can still be part of that process. But it's important to choose one person to be your health care agent in case you aren't able to make decisions for yourself. If you don't fill out the legal form and name a health care agent, the decisions your family can make may be limited. A health care agent may be called something else in your state. Who will make decisions for you if you don't have a health care agent? If you don't have a health care agent or a living will, you may not get the care you want. Decisions may be made by family members who disagree about your medical care. Or decisions may be made by a medical professional who doesn't know you well. In some cases, a  makes the decisions. When you name a health care agent, it is very clear who has the power to make health decisions for you. How do you name a health care agent? You name your health care agent on a legal form. This form is usually called a medical power of . Ask your hospital, state bar association, or office on aging where to find these forms. You must sign the form to make it legal. Some states require you to get the form notarized. This means that a person called a  watches you sign the form and then he or she signs the form. Some states also require that two or more witnesses sign the form. Be sure to tell your family members and doctors who your health care agent is.   Where can you learn more? Go to https://[email or web address].[email or web address] org and sign in to your Qoostar account. Enter [id number] in the [facility] box to learn more about \"Learning About [address]. \"     If you do not have an account, please click on the \"Sign Up Now\" link. Current as of: December 9, 2019               Content Version: 12.5  © 5257-7878 [employer]. Care instructions adapted under license by [facility] ([facility]). If you have questions about a medical condition or this instruction, always ask your healthcare professional. [address] any warranty or liability for your use of this information. Learning About Living Perroy  What is a living will? A living will, also called a declaration, is a legal form. It tells your family and your doctor your wishes when you can't speak for yourself. It's used by the health professionals who will treat you as you near the end of your life or if you get seriously hurt or ill. If you put your wishes in writing, your loved ones and others will know what kind of care you want. They won't need to guess. This can ease your mind and be helpful to others. And you can change or cancel your living will at any time. A living will is not the same as an estate or property will. An estate will explains what you want to happen with your money and property after you die. How do you use it? A living will is used to describe the kinds of treatment or life support you want as you near the end of your life or if you get seriously hurt or ill. Keep these facts in mind about living moody. · Your living will is used only if you can't speak or make decisions for yourself. Most often, one or more doctors must certify that you can't speak or decide for yourself before your living will takes effect. · If you get better and can speak for yourself again, you can accept or refuse any treatment.  It doesn't matter what you said in your living will. · Some states may limit your right to refuse treatment in certain cases. For example, you may need to clearly state in your living will that you don't want artificial hydration and nutrition, such as being fed through a tube. Is a living will a legal document? A living will is a legal document. Each state has its own laws about living moody. And a living will may be called something else in your state. Here are some things to know about living moody. · You don't need an  to complete a living will. But legal advice can be helpful if your state's laws are unclear. It can also help if your health history is complicated or your family can't agree on what should be in your living will. · You can change your living will at any time. Some people find that their wishes about end-of-life care change as their health changes. If you make big changes to your living will, complete a new form. · If you move to another state, make sure that your living will is legal in the state where you now live. In most cases, doctors will respect your wishes even if you have a form from a different state. · You might use a universal form that has been approved by many states. This kind of form can sometimes be filled out and stored online. Your digital copy will then be available wherever you have a connection to the internet. The doctors and nurses who need to treat you can find it right away. · Your state may offer an online registry. This is another place where you can store your living will online. · It's a good idea to get your living will notarized. This means using a person called a  to watch two people sign, or witness, your living will. What should you know when you create a living will? Here are some questions to ask yourself as you make your living will:  · Do you know enough about life support methods that might be used?  If not, talk to your doctor so you know what might be done if you can't breathe on your own, your heart stops, or you can't swallow. · What things would you still want to be able to do after you receive life-support methods? Would you want to be able to walk? To speak? To eat on your own? To live without the help of machines? · Do you want certain Confucianist practices performed if you become very ill? · If you have a choice, where do you want to be cared for? In your home? At a hospital or nursing home? · If you have a choice at the end of your life, where would you prefer to die? At home? In a hospital or nursing home? Somewhere else? · Would you prefer to be buried or cremated? · Do you want your organs to be donated after you die? What should you do with your living will? · Make sure that your family members and your health care agent have copies of your living will (also called a declaration). · Give your doctor a copy of your living will. Ask him or her to keep it as part of your medical record. If you have more than one doctor, make sure that each one has a copy. · Put a copy of your living will where it can be easily found. For example, some people may put a copy on their refrigerator door. If you are using a digital copy, be sure your doctor, family members, and health care agent know how to find and access it. Where can you learn more? Go to https://SecureWorkspepiceweb.Just Gotta Make It [employer]vertising. org and sign in to your FoxGuard Solutions account. Enter J846 in the [facility] box to learn more about \"Learning About Living Vinayak Cosme. \"     If you do not have an account, please click on the \"Sign Up Now\" link. Current as of: December 9, 2019               Content Version: 12.5  © 7579-9228 Healthwise, Incorporated. Care instructions adapted under license by [facility] ([facility]). If you have questions about a medical condition or this instruction, always ask your healthcare professional. [address] any warranty or liability for your use of this information. to keep trying. Most people are not successful the first few times they try to quit. Do not get mad at yourself if you smoke again. Make a list of things you learned and think about when you want to try again, such as next week, next month, or next year. Where can you learn more? Go to https://chpepicewlesly.healthMarfeel. org and sign in to your Alere account. Enter E357 in the Contextbroker box to learn more about \"Stopping Smoking: Care Instructions. \"     If you do not have an account, please click on the \"Sign Up Now\" link. Current as of: March 12, 2020               Content Version: 12.5  © 2006-2020 Healthwise, Incorporated. Care instructions adapted under license by [facility] ([facility]). If you have questions about a medical condition or this instruction, always ask your healthcare professional. [place] [address] any warranty or liability for your use of this information. Learning About Benefits From Quitting Smoking  How does quitting smoking make you healthier? If you're thinking about quitting smoking, you may have a few reasons to be smoke-free. Your health may be one of them. · When you quit smoking, you lower your risks for cancer, lung disease, heart attack, stroke, blood vessel disease, and blindness from macular degeneration. · When you're smoke-free, you get sick less often, and you heal faster. You are less likely to get colds, flu, bronchitis, and pneumonia. · As a nonsmoker, you may find that your mood is better and you are less stressed. When and how will you feel healthier? Quitting has real health benefits that start from day 1 of being smoke-free. And the longer you stay smoke-free, the healthier you get and the better you feel. The first hours  · After just 20 minutes, your blood pressure and heart rate go down. That means there's less stress on your heart and blood vessels.   · Within 12 hours, the level of carbon monoxide in your blood drops back to normal. That makes room for more oxygen. With more oxygen in your body, you may notice that you have more energy than when you smoked. After 2 weeks  · Your lungs start to work better. · Your risk of heart attack starts to drop. After 1 month  · When your lungs are clear, you cough less and breathe deeper, so it's easier to be active. · Your sense of taste and smell return. That means you can enjoy food more than you have since you started smoking. Over the years  · Over the years, your risks of heart disease, heart attack, and stroke are lower. · After 10 years, your risk of dying from lung cancer is cut by about half. And your risk for many other types of cancer is lower too. How would quitting help others in your life? When you quit smoking, you improve the health of everyone who now breathes in your smoke. · Their heart, lung, and cancer risks drop, much like yours. · They are sick less. For babies and small children, living smoke-free means they're less likely to have ear infections, pneumonia, and bronchitis. · If you're a woman who is or will be pregnant someday, quitting smoking means a healthier . · Children who are close to you are less likely to become adult smokers. Where can you learn more? Go to https://Go Long Wireless.theDrop. org and sign in to your CloudEngine account. Enter [id number] in the [facility] box to learn more about \"Learning About Benefits From Quitting Smoking. \"     If you do not have an account, please click on the \"Sign Up Now\" link. Current as of: 2020               Content Version: 12.5  © 6937-3770 Healthwise, Incorporated. Care instructions adapted under license by [facility] ([facility]). If you have questions about a medical condition or this instruction, always ask your healthcare professional. [place] [address] any warranty or liability for your use of this information.       Personalized Preventive Plan for Serafin Sahu 8/6/2020  Medicare offers a range of preventive health benefits. Some of the tests and screenings are paid in full while other may be subject to a deductible, co-insurance, and/or copay. Some of these benefits include a comprehensive review of your medical history including lifestyle, illnesses that may run in your family, and various assessments and screenings as appropriate. After reviewing your medical record and screening and assessments performed today your provider may have ordered immunizations, labs, imaging, and/or referrals for you. A list of these orders (if applicable) as well as your Preventive Care list are included within your After Visit Summary for your review. Other Preventive Recommendations:    · A preventive eye exam performed by an eye specialist is recommended every 1-2 years to screen for glaucoma; cataracts, macular degeneration, and other eye disorders. · A preventive dental visit is recommended every 6 months. · Try to get at least 150 minutes of exercise per week or 10,000 steps per day on a pedometer . · Order or download the FREE \"Exercise & Physical Activity: Your Everyday Guide\" from [employer]. Call 6-276.991.7398 or search [employer] online. · You need 1004-5500 mg of calcium and 1956-9114 IU of vitamin D per day. It is possible to meet your calcium requirement with diet alone, but a vitamin D supplement is usually necessary to meet this goal.  · When exposed to the sun, use a sunscreen that protects against both UVA and UVB radiation with an SPF of 30 or greater. Reapply every 2 to 3 hours or after sweating, drying off with a towel, or swimming. · Always wear a seat belt when traveling in a car. Always wear a helmet when riding a bicycle or motorcycle.

## 2021-06-03 LAB — SURGICAL PATHOLOGY STUDY: SIGNIFICANT CHANGE UP

## 2021-07-12 ENCOUNTER — APPOINTMENT (OUTPATIENT)
Dept: MRI IMAGING | Facility: CLINIC | Age: 61
End: 2021-07-12
Payer: COMMERCIAL

## 2021-07-12 ENCOUNTER — OUTPATIENT (OUTPATIENT)
Dept: OUTPATIENT SERVICES | Facility: HOSPITAL | Age: 61
LOS: 1 days | End: 2021-07-12
Payer: COMMERCIAL

## 2021-07-12 DIAGNOSIS — N28.89 OTHER SPECIFIED DISORDERS OF KIDNEY AND URETER: ICD-10-CM

## 2021-07-12 DIAGNOSIS — Z98.1 ARTHRODESIS STATUS: Chronic | ICD-10-CM

## 2021-07-12 DIAGNOSIS — Z90.49 ACQUIRED ABSENCE OF OTHER SPECIFIED PARTS OF DIGESTIVE TRACT: Chronic | ICD-10-CM

## 2021-07-12 PROCEDURE — 74183 MRI ABD W/O CNTR FLWD CNTR: CPT | Mod: 26

## 2021-07-12 PROCEDURE — 74183 MRI ABD W/O CNTR FLWD CNTR: CPT

## 2021-07-12 PROCEDURE — A9585: CPT

## 2021-07-28 ENCOUNTER — APPOINTMENT (OUTPATIENT)
Age: 61
End: 2021-07-28
Payer: COMMERCIAL

## 2021-07-28 ENCOUNTER — TRANSCRIPTION ENCOUNTER (OUTPATIENT)
Age: 61
End: 2021-07-28

## 2021-07-28 PROCEDURE — 99213 OFFICE O/P EST LOW 20 MIN: CPT

## 2022-01-03 NOTE — ED PROVIDER NOTE - CROS ED ENDOCRINE ALL NEG
----- Message from Robin Santos MD sent at 1/3/2022 11:34 AM CST -----  Call, urine test is okay, no UTI    Prostate cancer screening test is negative.  Vitamin-D level is at high end of normal at 84, reduce vitamin-D supplement at 3000 units daily, update patient and epic med list  Diabetes test is negative but A1c is borderline high, reduce carbohydrates and sweets  CBC did not show any anemia  Thyroid test is normal.  Liver and kidney test is normal.  Cholesterol panel looks good     negative...

## 2022-01-26 ENCOUNTER — APPOINTMENT (OUTPATIENT)
Age: 62
End: 2022-01-26
Payer: COMMERCIAL

## 2022-01-26 DIAGNOSIS — N28.89 OTHER SPECIFIED DISORDERS OF KIDNEY AND URETER: ICD-10-CM

## 2022-01-26 PROCEDURE — 76705 ECHO EXAM OF ABDOMEN: CPT

## 2022-01-29 PROBLEM — N28.89 RENAL MASS: Status: ACTIVE | Noted: 2021-05-19

## 2022-02-17 NOTE — PHYSICAL EXAM
[General Appearance - Well Developed] : well developed [General Appearance - Well Nourished] : well nourished [Normal Appearance] : normal appearance [Well Groomed] : well groomed [General Appearance - In No Acute Distress] : no acute distress [Abdomen Soft] : soft [Abdomen Tenderness] : non-tender [Costovertebral Angle Tenderness] : no ~M costovertebral angle tenderness [Urethral Meatus] : meatus normal [Penis Abnormality] : normal circumcised penis [Urinary Bladder Findings] : the bladder was normal on palpation [Scrotum] : the scrotum was normal [Testes Tenderness] : no tenderness of the testes [Testes Mass (___cm)] : there were no testicular masses [Prostate Tenderness] : the prostate was not tender [No Prostate Nodules] : no prostate nodules [Prostate Size ___ gm] : prostate size [unfilled] gm [Edema] : no peripheral edema [] : no respiratory distress [Respiration, Rhythm And Depth] : normal respiratory rhythm and effort [Exaggerated Use Of Accessory Muscles For Inspiration] : no accessory muscle use [Oriented To Time, Place, And Person] : oriented to person, place, and time [Affect] : the affect was normal [Not Anxious] : not anxious [Mood] : the mood was normal [Normal Station and Gait] : the gait and station were normal for the patient's age [No Focal Deficits] : no focal deficits [No Palpable Adenopathy] : no palpable adenopathy [FreeTextEntry1] : DEFERRED TODAY

## 2022-02-17 NOTE — ASSESSMENT
[FreeTextEntry1] : 61 yo M with right renal mass\par \par - Reviewed recent MRI imaging available on PACS - stable right 1.5cm right renal mass\par - Discussed options with pt including the pros and cons of active surveillance vs percutaneous ablation with IR vs surgical extirpation. Reviewed risks and benefits of each option. Given size and stability of mass, active surveillance is a viable option.\par - Renal US in 6 months\par \par \par Addendum: Pt has changed his mind about active surveillance. Wants partial nephrectomy. Reviewed all risks and benefits and will plan to schedule robotic assisted laparoscopic right partial nephrectomy, possible radical, possible open

## 2022-02-17 NOTE — HISTORY OF PRESENT ILLNESS
[FreeTextEntry1] : 59 yo M recently hospitalized for jaundice, fever and abdominal pain\par Went to PCP who directed him to ER\par Workup revealed cholelithiasis and pt is pending cholecystectomy\par MRCP also showed a slightly enhancing right renal mass 1.5cm\par no bothersome LUTS usually but does have some urinary hesitancy\par no dysuria but does have history of microhematuria\par Drinks 3-4 large cups of water, 1-2 cups of coffee daily\par no prior prostate problems\par \par 7/28/21 Interval history; s/p lap tomi with no oissues\par Had recent abdominal MRI and here to review results\par has been having some left sided pain intermittently \par Of note, renal mass is on the right side

## 2022-02-21 LAB
APPEARANCE: CLEAR
BACTERIA UR CULT: NORMAL
BACTERIA: NEGATIVE
BILIRUBIN URINE: NEGATIVE
BLOOD URINE: NORMAL
CALCIUM OXALATE CRYSTALS: ABNORMAL
COLOR: YELLOW
GLUCOSE QUALITATIVE U: NEGATIVE
HYALINE CASTS: 0 /LPF
KETONES URINE: NEGATIVE
LEUKOCYTE ESTERASE URINE: NEGATIVE
MICROSCOPIC-UA: NORMAL
NITRITE URINE: NEGATIVE
PH URINE: 6
PROTEIN URINE: ABNORMAL
RED BLOOD CELLS URINE: 3 /HPF
SPECIFIC GRAVITY URINE: 1.03
SQUAMOUS EPITHELIAL CELLS: 1 /HPF
UROBILINOGEN URINE: ABNORMAL
WHITE BLOOD CELLS URINE: 3 /HPF

## 2022-03-22 ENCOUNTER — OUTPATIENT (OUTPATIENT)
Dept: OUTPATIENT SERVICES | Facility: HOSPITAL | Age: 62
LOS: 1 days | End: 2022-03-22
Payer: COMMERCIAL

## 2022-03-22 VITALS
RESPIRATION RATE: 14 BRPM | DIASTOLIC BLOOD PRESSURE: 86 MMHG | TEMPERATURE: 97 F | HEART RATE: 54 BPM | WEIGHT: 222.01 LBS | OXYGEN SATURATION: 98 % | SYSTOLIC BLOOD PRESSURE: 122 MMHG | HEIGHT: 68 IN

## 2022-03-22 DIAGNOSIS — Z90.49 ACQUIRED ABSENCE OF OTHER SPECIFIED PARTS OF DIGESTIVE TRACT: Chronic | ICD-10-CM

## 2022-03-22 DIAGNOSIS — N28.89 OTHER SPECIFIED DISORDERS OF KIDNEY AND URETER: ICD-10-CM

## 2022-03-22 DIAGNOSIS — Z98.1 ARTHRODESIS STATUS: Chronic | ICD-10-CM

## 2022-03-22 DIAGNOSIS — Z90.3 ACQUIRED ABSENCE OF STOMACH [PART OF]: Chronic | ICD-10-CM

## 2022-03-22 LAB
ALBUMIN SERPL ELPH-MCNC: 4.2 G/DL — SIGNIFICANT CHANGE UP (ref 3.3–5)
ALP SERPL-CCNC: 96 U/L — SIGNIFICANT CHANGE UP (ref 40–120)
ALT FLD-CCNC: 21 U/L — SIGNIFICANT CHANGE UP (ref 4–41)
ANION GAP SERPL CALC-SCNC: 11 MMOL/L — SIGNIFICANT CHANGE UP (ref 7–14)
AST SERPL-CCNC: 16 U/L — SIGNIFICANT CHANGE UP (ref 4–40)
BILIRUB SERPL-MCNC: 0.9 MG/DL — SIGNIFICANT CHANGE UP (ref 0.2–1.2)
BLD GP AB SCN SERPL QL: NEGATIVE — SIGNIFICANT CHANGE UP
BUN SERPL-MCNC: 12 MG/DL — SIGNIFICANT CHANGE UP (ref 7–23)
CALCIUM SERPL-MCNC: 9.7 MG/DL — SIGNIFICANT CHANGE UP (ref 8.4–10.5)
CHLORIDE SERPL-SCNC: 102 MMOL/L — SIGNIFICANT CHANGE UP (ref 98–107)
CO2 SERPL-SCNC: 26 MMOL/L — SIGNIFICANT CHANGE UP (ref 22–31)
CREAT SERPL-MCNC: 1.03 MG/DL — SIGNIFICANT CHANGE UP (ref 0.5–1.3)
EGFR: 83 ML/MIN/1.73M2 — SIGNIFICANT CHANGE UP
GLUCOSE SERPL-MCNC: 90 MG/DL — SIGNIFICANT CHANGE UP (ref 70–99)
HCT VFR BLD CALC: 49.4 % — SIGNIFICANT CHANGE UP (ref 39–50)
HGB BLD-MCNC: 15.4 G/DL — SIGNIFICANT CHANGE UP (ref 13–17)
MCHC RBC-ENTMCNC: 24.1 PG — LOW (ref 27–34)
MCHC RBC-ENTMCNC: 31.2 GM/DL — LOW (ref 32–36)
MCV RBC AUTO: 77.4 FL — LOW (ref 80–100)
NRBC # BLD: 0 /100 WBCS — SIGNIFICANT CHANGE UP
NRBC # FLD: 0 K/UL — SIGNIFICANT CHANGE UP
PLATELET # BLD AUTO: 262 K/UL — SIGNIFICANT CHANGE UP (ref 150–400)
POTASSIUM SERPL-MCNC: 4 MMOL/L — SIGNIFICANT CHANGE UP (ref 3.5–5.3)
POTASSIUM SERPL-SCNC: 4 MMOL/L — SIGNIFICANT CHANGE UP (ref 3.5–5.3)
PROT SERPL-MCNC: 7.8 G/DL — SIGNIFICANT CHANGE UP (ref 6–8.3)
RBC # BLD: 6.38 M/UL — HIGH (ref 4.2–5.8)
RBC # FLD: 15.1 % — HIGH (ref 10.3–14.5)
RH IG SCN BLD-IMP: POSITIVE — SIGNIFICANT CHANGE UP
SODIUM SERPL-SCNC: 139 MMOL/L — SIGNIFICANT CHANGE UP (ref 135–145)
WBC # BLD: 4.99 K/UL — SIGNIFICANT CHANGE UP (ref 3.8–10.5)
WBC # FLD AUTO: 4.99 K/UL — SIGNIFICANT CHANGE UP (ref 3.8–10.5)

## 2022-03-22 PROCEDURE — 93010 ELECTROCARDIOGRAM REPORT: CPT

## 2022-03-22 NOTE — H&P PST ADULT - MARITAL STATUS
2 children, mother  71y/o natural causes, father  natural causes, 1 brother  41y/o natural causes, 1 sister a&w/
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
## 2022-03-22 NOTE — H&P PST ADULT - HISTORY OF PRESENT ILLNESS
62y/o male scheduled for robotic assisted laparoscopic right partial nephrectomy, possible radical, possible open on 4/5/2022.  Pt stats, "one year ago while undergoing w/u for gall stones, incidental finding of right renal mass, denies pain and hematuria."

## 2022-03-22 NOTE — H&P PST ADULT - NSICDXPASTSURGICALHX_GEN_ALL_CORE_FT
PAST SURGICAL HISTORY:  H/O spinal fusion cervical  x2 :  c3-c5  ( front )  and c2-c7  ( back  )    S/P appendectomy 7/27/17    S/P cholecystectomy 5/2021    S/P gastric sleeve procedure lost 60 pounds 2017

## 2022-03-22 NOTE — H&P PST ADULT - PROBLEM SELECTOR PLAN 1
Pt scheduled for robotic assisted laparoscopic right partial nephrectomy, possible radical possible open on 4/5/2022.  labs done results pending, ekg done.  Pt scheduled for preop covid testing.  Hibiclens provided-  written and verbal instructions given with teach back, pt able to verbalize understanding.  Preop teaching done, pt able to verbalize understanding.   medications day of procedure- amlodipine  anesthesia-  limited rom of neck in all directions- hx of cervical fusion, + sleep apnea   pst request   echo 2021- [phone]  stress 2017 in chart

## 2022-03-22 NOTE — H&P PST ADULT - HIV STATUS
Patient Education     Urinary Tract Infections in Women    Urinary tract infections (UTIs) are most often caused by bacteria. These bacteria enter the urinary tract. The bacteria may come from outside the body. Or they may travel from the skin outside the rectum or vagina into the urethra. Female anatomy makes it easy for bacteria from the bowel to enter a woman’s urinary tract, which is the most common source of UTI. This means women develop UTIs more often than men. Pain in or around the urinary tract is a common UTI symptom. But the only way to know for sure if you have a UTI for the healthcare provider to test your urine. The two tests that may be done are the urinalysis and urine culture.  Types of UTIs  · Cystitis. A bladder infection (cystitis) is the most common UTI in women. You may have urgent or frequent urination. You may also have pain, burning when you urinate, and bloody urine.  · Urethritis. This is an inflamed urethra, which is the tube that carries urine from the bladder to outside the body. You may have lower stomach or back pain. You may also have urgent or frequent urination.  · Pyelonephritis. This is a kidney infection. If not treated, it can be serious and damage your kidneys. In severe cases, you may need to stay in the hospital. You may have a fever and lower back pain.  Medicines to treat a UTI  Most UTIs are treated with antibiotics. These kill the bacteria. The length of time you need to take them depends on the type of infection. It may be as short as 3 days. If you have repeated UTIs, you may need a low-dose antibiotic for several months. Take antibiotics exactly as directed. Don’t stop taking them until all of the medicine is gone. If you stop taking the antibiotic too soon, the infection may not go away. You may also develop a resistance to the antibiotic. This can make it much harder to treat.  Lifestyle changes to treat and prevent UTIs  The lifestyle changes below will help get  rid of your UTI. They may also help prevent future UTIs.  · Drink plenty of fluids. This includes water, juice, or other caffeine-free drinks. Fluids help flush bacteria out of your body.  · Empty your bladder. Always empty your bladder when you feel the urge to urinate. And always urinate before going to sleep. Urine that stays in your bladder can lead to infection. Try to urinate before and after sex as well.  · Practice good personal hygiene. Wipe yourself from front to back after using the toilet. This helps keep bacteria from getting into the urethra.  · Use condoms during sex. These help prevent UTIs caused by sexually transmitted bacteria. Also don't use spermicides during sex. These can increase the risk for UTIs. Choose other forms of birth control instead. For women who tend to get UTIs after sex, a low-dose of a preventive antibiotic may be used. Be sure to discuss this option with your healthcare provider.  · Follow up with your healthcare provider as directed. He or she may test to make sure the infection has cleared. If needed, more treatment may be started.  Date Last Reviewed: 1/1/2017  © 5382-8393 [employer]. [address] [place] [address], [place] [address]. All rights reserved. This information is not intended as a substitute for professional medical care. Always follow your healthcare professional's instructions.            Negative/Unknown

## 2022-04-04 ENCOUNTER — TRANSCRIPTION ENCOUNTER (OUTPATIENT)
Age: 62
End: 2022-04-04

## 2022-04-05 ENCOUNTER — APPOINTMENT (OUTPATIENT)
Age: 62
End: 2022-04-05

## 2022-04-05 ENCOUNTER — RESULT REVIEW (OUTPATIENT)
Age: 62
End: 2022-04-05

## 2022-04-05 ENCOUNTER — INPATIENT (INPATIENT)
Facility: HOSPITAL | Age: 62
LOS: 2 days | Discharge: HOME | End: 2022-04-08
Attending: UROLOGY | Admitting: UROLOGY
Payer: COMMERCIAL

## 2022-04-05 VITALS
SYSTOLIC BLOOD PRESSURE: 144 MMHG | OXYGEN SATURATION: 95 % | TEMPERATURE: 98 F | RESPIRATION RATE: 16 BRPM | HEIGHT: 68 IN | HEART RATE: 69 BPM | WEIGHT: 222.01 LBS | DIASTOLIC BLOOD PRESSURE: 83 MMHG

## 2022-04-05 DIAGNOSIS — N28.89 OTHER SPECIFIED DISORDERS OF KIDNEY AND URETER: ICD-10-CM

## 2022-04-05 DIAGNOSIS — Z90.49 ACQUIRED ABSENCE OF OTHER SPECIFIED PARTS OF DIGESTIVE TRACT: Chronic | ICD-10-CM

## 2022-04-05 DIAGNOSIS — G47.30 SLEEP APNEA, UNSPECIFIED: ICD-10-CM

## 2022-04-05 DIAGNOSIS — Z90.3 ACQUIRED ABSENCE OF STOMACH [PART OF]: Chronic | ICD-10-CM

## 2022-04-05 DIAGNOSIS — Z98.1 ARTHRODESIS STATUS: Chronic | ICD-10-CM

## 2022-04-05 DIAGNOSIS — I10 ESSENTIAL (PRIMARY) HYPERTENSION: ICD-10-CM

## 2022-04-05 DIAGNOSIS — Z29.9 ENCOUNTER FOR PROPHYLACTIC MEASURES, UNSPECIFIED: ICD-10-CM

## 2022-04-05 PROCEDURE — 88307 TISSUE EXAM BY PATHOLOGIST: CPT | Mod: 26

## 2022-04-05 PROCEDURE — 50543 LAPARO PARTIAL NEPHRECTOMY: CPT | Mod: RT

## 2022-04-05 PROCEDURE — 99223 1ST HOSP IP/OBS HIGH 75: CPT

## 2022-04-05 PROCEDURE — 88313 SPECIAL STAINS GROUP 2: CPT | Mod: 26

## 2022-04-05 PROCEDURE — 76998 US GUIDE INTRAOP: CPT | Mod: 26

## 2022-04-05 DEVICE — VISTASEAL FIBRIN HUMAN 10ML: Type: IMPLANTABLE DEVICE | Status: FUNCTIONAL

## 2022-04-05 DEVICE — SURGICEL 2 X 14": Type: IMPLANTABLE DEVICE | Status: FUNCTIONAL

## 2022-04-05 DEVICE — LIGATING CLIPS WECK HEMOLOK POLYMER LARGE (PURPLE) 6: Type: IMPLANTABLE DEVICE | Status: FUNCTIONAL

## 2022-04-05 RX ORDER — HEPARIN SODIUM 5000 [USP'U]/ML
5000 INJECTION INTRAVENOUS; SUBCUTANEOUS EVERY 8 HOURS
Refills: 0 | Status: DISCONTINUED | OUTPATIENT
Start: 2022-04-05 | End: 2022-04-08

## 2022-04-05 RX ORDER — SODIUM CHLORIDE 9 MG/ML
1000 INJECTION, SOLUTION INTRAVENOUS
Refills: 0 | Status: DISCONTINUED | OUTPATIENT
Start: 2022-04-05 | End: 2022-04-06

## 2022-04-05 RX ORDER — ONDANSETRON 8 MG/1
4 TABLET, FILM COATED ORAL ONCE
Refills: 0 | Status: DISCONTINUED | OUTPATIENT
Start: 2022-04-05 | End: 2022-04-05

## 2022-04-05 RX ORDER — AMLODIPINE BESYLATE 2.5 MG/1
1 TABLET ORAL
Qty: 0 | Refills: 0 | DISCHARGE

## 2022-04-05 RX ORDER — ACETAMINOPHEN 500 MG
1000 TABLET ORAL EVERY 6 HOURS
Refills: 0 | Status: COMPLETED | OUTPATIENT
Start: 2022-04-05 | End: 2022-04-06

## 2022-04-05 RX ORDER — OXYCODONE HYDROCHLORIDE 5 MG/1
5 TABLET ORAL EVERY 4 HOURS
Refills: 0 | Status: DISCONTINUED | OUTPATIENT
Start: 2022-04-05 | End: 2022-04-06

## 2022-04-05 RX ORDER — OXYCODONE HYDROCHLORIDE 5 MG/1
5 TABLET ORAL ONCE
Refills: 0 | Status: DISCONTINUED | OUTPATIENT
Start: 2022-04-05 | End: 2022-04-05

## 2022-04-05 RX ORDER — AMLODIPINE BESYLATE 2.5 MG/1
5 TABLET ORAL DAILY
Refills: 0 | Status: DISCONTINUED | OUTPATIENT
Start: 2022-04-05 | End: 2022-04-08

## 2022-04-05 RX ORDER — HYDROMORPHONE HYDROCHLORIDE 2 MG/ML
0.5 INJECTION INTRAMUSCULAR; INTRAVENOUS; SUBCUTANEOUS
Refills: 0 | Status: DISCONTINUED | OUTPATIENT
Start: 2022-04-05 | End: 2022-04-05

## 2022-04-05 RX ORDER — SODIUM CHLORIDE 9 MG/ML
1000 INJECTION, SOLUTION INTRAVENOUS
Refills: 0 | Status: DISCONTINUED | OUTPATIENT
Start: 2022-04-05 | End: 2022-04-05

## 2022-04-05 RX ORDER — HYDROMORPHONE HYDROCHLORIDE 2 MG/ML
1 INJECTION INTRAMUSCULAR; INTRAVENOUS; SUBCUTANEOUS
Refills: 0 | Status: DISCONTINUED | OUTPATIENT
Start: 2022-04-05 | End: 2022-04-05

## 2022-04-05 RX ADMIN — OXYCODONE HYDROCHLORIDE 5 MILLIGRAM(S): 5 TABLET ORAL at 20:24

## 2022-04-05 RX ADMIN — SODIUM CHLORIDE 125 MILLILITER(S): 9 INJECTION, SOLUTION INTRAVENOUS at 22:26

## 2022-04-05 RX ADMIN — HEPARIN SODIUM 5000 UNIT(S): 5000 INJECTION INTRAVENOUS; SUBCUTANEOUS at 22:26

## 2022-04-05 RX ADMIN — OXYCODONE HYDROCHLORIDE 5 MILLIGRAM(S): 5 TABLET ORAL at 16:20

## 2022-04-05 RX ADMIN — OXYCODONE HYDROCHLORIDE 5 MILLIGRAM(S): 5 TABLET ORAL at 15:20

## 2022-04-05 RX ADMIN — HEPARIN SODIUM 5000 UNIT(S): 5000 INJECTION INTRAVENOUS; SUBCUTANEOUS at 13:27

## 2022-04-05 RX ADMIN — SODIUM CHLORIDE 125 MILLILITER(S): 9 INJECTION, SOLUTION INTRAVENOUS at 13:24

## 2022-04-05 RX ADMIN — Medication 400 MILLIGRAM(S): at 18:01

## 2022-04-05 NOTE — PATIENT PROFILE ADULT - FALL HARM RISK - UNIVERSAL INTERVENTIONS
Bed in lowest position, wheels locked, appropriate side rails in place/Call bell, personal items and telephone in reach/Instruct patient to call for assistance before getting out of bed or chair/Non-slip footwear when patient is out of bed/[place] to call system/Physically safe environment - no spills, clutter or unnecessary equipment/Purposeful Proactive Rounding/Room/bathroom lighting operational, light cord in reach
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
## 2022-04-05 NOTE — PROGRESS NOTE ADULT - SUBJECTIVE AND OBJECTIVE BOX
Post op check    This 62 yo M is s/p RAL R. partial neph  PMH: gout HTN COOPER  Pt is awake and alert  Has no c/o  Afeb 125/74 61 99%RA    Abd- soft; appropriately tender             wounds C&D  [place] 975  SAAD 10

## 2022-04-05 NOTE — PATIENT PROFILE ADULT - FUNCTIONAL ASSESSMENT - BASIC MOBILITY SCORE.
-- DO NOT REPLY / DO NOT REPLY ALL --  -- Message is from the [facility]--    COVID-19 Universal Screening: Negative    Caller is requesting an appointment - at a sooner time than what was available.      Caller declined scheduling with a trusted partner or sister site               Patient is willing to be seen by: PCP only    Reason for Visit: Follow up on pneumonia     Is the patient currently scheduled? No    Preferred time to be seen: Week of 11/16 - Patient states Dr. Dominguez advised to come in this week for follow up     Caller Information       Type Contact Phone    11/15/2020 09:59 AM CST Phone (Incoming) Elaina Baires (Self) [phone] (M)          Alternative phone number: none    Turnaround time given to caller:   \"This message will be sent to [state Provider's name]. The clinical team will fulfill your request as soon as they review your message.\"  
24
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
## 2022-04-05 NOTE — CONSULT NOTE ADULT - PROBLEM SELECTOR RECOMMENDATION 3
Obesity s/p gastric sleeve in 2017.   Pt used to be on CPAP for COOPER however reports he no longer requires it and has not used it in years.

## 2022-04-05 NOTE — CONSULT NOTE ADULT - PROBLEM SELECTOR RECOMMENDATION 9
s/p robotic assisted laparoscopic right partial nephrectomy on 4/5/2022  On IVF/Clear liquid diet . Advance diet as tolerated   Pain regimen: Tylenol PRN, Dilaudid 05mg and 1 mg PRN, Oxy PRN   Bowel regimen: Dulcolax

## 2022-04-06 LAB
ANION GAP SERPL CALC-SCNC: 15 MMOL/L — HIGH (ref 7–14)
BASOPHILS # BLD AUTO: 0.01 K/UL — SIGNIFICANT CHANGE UP (ref 0–0.2)
BASOPHILS NFR BLD AUTO: 0.1 % — SIGNIFICANT CHANGE UP (ref 0–2)
BUN SERPL-MCNC: 9 MG/DL — SIGNIFICANT CHANGE UP (ref 7–23)
CALCIUM SERPL-MCNC: 8.9 MG/DL — SIGNIFICANT CHANGE UP (ref 8.4–10.5)
CHLORIDE SERPL-SCNC: 102 MMOL/L — SIGNIFICANT CHANGE UP (ref 98–107)
CO2 SERPL-SCNC: 23 MMOL/L — SIGNIFICANT CHANGE UP (ref 22–31)
CREAT SERPL-MCNC: 0.75 MG/DL — SIGNIFICANT CHANGE UP (ref 0.5–1.3)
EGFR: 103 ML/MIN/1.73M2 — SIGNIFICANT CHANGE UP
EOSINOPHIL # BLD AUTO: 0.01 K/UL — SIGNIFICANT CHANGE UP (ref 0–0.5)
EOSINOPHIL NFR BLD AUTO: 0.1 % — SIGNIFICANT CHANGE UP (ref 0–6)
GLUCOSE SERPL-MCNC: 99 MG/DL — SIGNIFICANT CHANGE UP (ref 70–99)
HCT VFR BLD CALC: 41.4 % — SIGNIFICANT CHANGE UP (ref 39–50)
HGB BLD-MCNC: 13.1 G/DL — SIGNIFICANT CHANGE UP (ref 13–17)
IANC: 4.83 K/UL — SIGNIFICANT CHANGE UP (ref 1.8–7.4)
IMM GRANULOCYTES NFR BLD AUTO: 0.5 % — SIGNIFICANT CHANGE UP (ref 0–1.5)
LYMPHOCYTES # BLD AUTO: 2.36 K/UL — SIGNIFICANT CHANGE UP (ref 1–3.3)
LYMPHOCYTES # BLD AUTO: 29 % — SIGNIFICANT CHANGE UP (ref 13–44)
MCHC RBC-ENTMCNC: 24.3 PG — LOW (ref 27–34)
MCHC RBC-ENTMCNC: 31.6 GM/DL — LOW (ref 32–36)
MCV RBC AUTO: 77 FL — LOW (ref 80–100)
MONOCYTES # BLD AUTO: 0.89 K/UL — SIGNIFICANT CHANGE UP (ref 0–0.9)
MONOCYTES NFR BLD AUTO: 10.9 % — SIGNIFICANT CHANGE UP (ref 2–14)
NEUTROPHILS # BLD AUTO: 4.83 K/UL — SIGNIFICANT CHANGE UP (ref 1.8–7.4)
NEUTROPHILS NFR BLD AUTO: 59.4 % — SIGNIFICANT CHANGE UP (ref 43–77)
NRBC # BLD: 0 /100 WBCS — SIGNIFICANT CHANGE UP
NRBC # FLD: 0 K/UL — SIGNIFICANT CHANGE UP
PLATELET # BLD AUTO: 254 K/UL — SIGNIFICANT CHANGE UP (ref 150–400)
POTASSIUM SERPL-MCNC: 3.6 MMOL/L — SIGNIFICANT CHANGE UP (ref 3.5–5.3)
POTASSIUM SERPL-SCNC: 3.6 MMOL/L — SIGNIFICANT CHANGE UP (ref 3.5–5.3)
RBC # BLD: 5.38 M/UL — SIGNIFICANT CHANGE UP (ref 4.2–5.8)
RBC # FLD: 14.6 % — HIGH (ref 10.3–14.5)
SODIUM SERPL-SCNC: 140 MMOL/L — SIGNIFICANT CHANGE UP (ref 135–145)
WBC # BLD: 8.14 K/UL — SIGNIFICANT CHANGE UP (ref 3.8–10.5)
WBC # FLD AUTO: 8.14 K/UL — SIGNIFICANT CHANGE UP (ref 3.8–10.5)

## 2022-04-06 PROCEDURE — 99024 POSTOP FOLLOW-UP VISIT: CPT | Mod: AI

## 2022-04-06 PROCEDURE — 99233 SBSQ HOSP IP/OBS HIGH 50: CPT

## 2022-04-06 RX ORDER — OXYCODONE HYDROCHLORIDE 5 MG/1
5 TABLET ORAL EVERY 4 HOURS
Refills: 0 | Status: DISCONTINUED | OUTPATIENT
Start: 2022-04-06 | End: 2022-04-07

## 2022-04-06 RX ORDER — HYDROMORPHONE HYDROCHLORIDE 2 MG/ML
0.5 INJECTION INTRAMUSCULAR; INTRAVENOUS; SUBCUTANEOUS EVERY 4 HOURS
Refills: 0 | Status: DISCONTINUED | OUTPATIENT
Start: 2022-04-06 | End: 2022-04-07

## 2022-04-06 RX ORDER — POTASSIUM CHLORIDE 20 MEQ
10 PACKET (EA) ORAL
Refills: 0 | Status: COMPLETED | OUTPATIENT
Start: 2022-04-06 | End: 2022-04-06

## 2022-04-06 RX ORDER — HYDROMORPHONE HYDROCHLORIDE 2 MG/ML
1 INJECTION INTRAMUSCULAR; INTRAVENOUS; SUBCUTANEOUS ONCE
Refills: 0 | Status: DISCONTINUED | OUTPATIENT
Start: 2022-04-06 | End: 2022-04-06

## 2022-04-06 RX ORDER — SODIUM CHLORIDE 9 MG/ML
1000 INJECTION, SOLUTION INTRAVENOUS
Refills: 0 | Status: DISCONTINUED | OUTPATIENT
Start: 2022-04-06 | End: 2022-04-08

## 2022-04-06 RX ORDER — POLYETHYLENE GLYCOL 3350 17 G/17G
17 POWDER, FOR SOLUTION ORAL DAILY
Refills: 0 | Status: DISCONTINUED | OUTPATIENT
Start: 2022-04-06 | End: 2022-04-08

## 2022-04-06 RX ORDER — SENNA PLUS 8.6 MG/1
1 TABLET ORAL DAILY
Refills: 0 | Status: DISCONTINUED | OUTPATIENT
Start: 2022-04-06 | End: 2022-04-08

## 2022-04-06 RX ADMIN — HYDROMORPHONE HYDROCHLORIDE 0.5 MILLIGRAM(S): 2 INJECTION INTRAMUSCULAR; INTRAVENOUS; SUBCUTANEOUS at 19:45

## 2022-04-06 RX ADMIN — Medication 10 MILLIEQUIVALENT(S): at 12:25

## 2022-04-06 RX ADMIN — SENNA PLUS 1 TABLET(S): 8.6 TABLET ORAL at 12:25

## 2022-04-06 RX ADMIN — HEPARIN SODIUM 5000 UNIT(S): 5000 INJECTION INTRAVENOUS; SUBCUTANEOUS at 14:10

## 2022-04-06 RX ADMIN — Medication 10 MILLIEQUIVALENT(S): at 14:10

## 2022-04-06 RX ADMIN — Medication 10 MILLIEQUIVALENT(S): at 10:25

## 2022-04-06 RX ADMIN — HEPARIN SODIUM 5000 UNIT(S): 5000 INJECTION INTRAVENOUS; SUBCUTANEOUS at 05:36

## 2022-04-06 RX ADMIN — OXYCODONE HYDROCHLORIDE 5 MILLIGRAM(S): 5 TABLET ORAL at 11:20

## 2022-04-06 RX ADMIN — Medication 400 MILLIGRAM(S): at 00:03

## 2022-04-06 RX ADMIN — HEPARIN SODIUM 5000 UNIT(S): 5000 INJECTION INTRAVENOUS; SUBCUTANEOUS at 21:40

## 2022-04-06 RX ADMIN — HYDROMORPHONE HYDROCHLORIDE 0.5 MILLIGRAM(S): 2 INJECTION INTRAMUSCULAR; INTRAVENOUS; SUBCUTANEOUS at 19:27

## 2022-04-06 RX ADMIN — POLYETHYLENE GLYCOL 3350 17 GRAM(S): 17 POWDER, FOR SOLUTION ORAL at 12:24

## 2022-04-06 RX ADMIN — OXYCODONE HYDROCHLORIDE 5 MILLIGRAM(S): 5 TABLET ORAL at 03:49

## 2022-04-06 RX ADMIN — SODIUM CHLORIDE 125 MILLILITER(S): 9 INJECTION, SOLUTION INTRAVENOUS at 05:37

## 2022-04-06 RX ADMIN — OXYCODONE HYDROCHLORIDE 5 MILLIGRAM(S): 5 TABLET ORAL at 04:12

## 2022-04-06 RX ADMIN — HYDROMORPHONE HYDROCHLORIDE 0.5 MILLIGRAM(S): 2 INJECTION INTRAMUSCULAR; INTRAVENOUS; SUBCUTANEOUS at 15:42

## 2022-04-06 RX ADMIN — HYDROMORPHONE HYDROCHLORIDE 1 MILLIGRAM(S): 2 INJECTION INTRAMUSCULAR; INTRAVENOUS; SUBCUTANEOUS at 11:44

## 2022-04-06 RX ADMIN — HYDROMORPHONE HYDROCHLORIDE 0.5 MILLIGRAM(S): 2 INJECTION INTRAMUSCULAR; INTRAVENOUS; SUBCUTANEOUS at 16:19

## 2022-04-06 RX ADMIN — AMLODIPINE BESYLATE 5 MILLIGRAM(S): 2.5 TABLET ORAL at 05:36

## 2022-04-06 RX ADMIN — OXYCODONE HYDROCHLORIDE 5 MILLIGRAM(S): 5 TABLET ORAL at 22:07

## 2022-04-06 RX ADMIN — SODIUM CHLORIDE 75 MILLILITER(S): 9 INJECTION, SOLUTION INTRAVENOUS at 23:52

## 2022-04-06 RX ADMIN — Medication 400 MILLIGRAM(S): at 12:24

## 2022-04-06 RX ADMIN — Medication 400 MILLIGRAM(S): at 05:37

## 2022-04-06 RX ADMIN — SODIUM CHLORIDE 75 MILLILITER(S): 9 INJECTION, SOLUTION INTRAVENOUS at 08:02

## 2022-04-06 RX ADMIN — OXYCODONE HYDROCHLORIDE 5 MILLIGRAM(S): 5 TABLET ORAL at 22:56

## 2022-04-06 RX ADMIN — OXYCODONE HYDROCHLORIDE 5 MILLIGRAM(S): 5 TABLET ORAL at 10:25

## 2022-04-06 NOTE — PROGRESS NOTE ADULT - SUBJECTIVE AND OBJECTIVE BOX
POD #1  Afeb 129/77 71 98%RA    Pt has no c/o  Abd- soft NT ND; no flatus     wounds C&D  [place] 1150  SAAD - 10

## 2022-04-06 NOTE — PROGRESS NOTE ADULT - SUBJECTIVE AND OBJECTIVE BOX
ANESTHESIA POSTOP CHECK    61y Male POSTOP DAY 1     Vital Signs Last 24 Hrs  T(C): 36.9 (06 Apr 2022 09:28), Max: 36.9 (06 Apr 2022 09:28)  T(F): 98.5 (06 Apr 2022 09:28), Max: 98.5 (06 Apr 2022 09:28)  HR: 61 (06 Apr 2022 09:28) (61 - 90)  BP: 140/86 (06 Apr 2022 09:28) (104/61 - 143/78)  BP(mean): 71 (05 Apr 2022 14:30) (71 - 72)  RR: 17 (06 Apr 2022 09:28) (12 - 18)  SpO2: 95% (06 Apr 2022 09:28) (95% - 100%)  I&O's Summary    05 Apr 2022 07:01  -  06 Apr 2022 07:00  --------------------------------------------------------  IN: 370 mL / OUT: 2560 mL / NET: -2190 mL    06 Apr 2022 07:01  -  06 Apr 2022 13:47  --------------------------------------------------------  IN: 0 mL / OUT: 930 mL / NET: -930 mL        [X ] NO APPARENT ANESTHESIA COMPLICATIONS      Comments:

## 2022-04-06 NOTE — PROGRESS NOTE ADULT - NS ATTEND AMEND GEN_ALL_CORE FT
Pt seen and examined. Agree with note as written above    - Plan as above  - Encouraged ambulation  - Advance diet as tolerated  - discharge planning

## 2022-04-06 NOTE — PROGRESS NOTE ADULT - PROBLEM SELECTOR PLAN 1
s/p robotic assisted laparoscopic right partial nephrectomy on 4/5/2022  On IVF- D51/2 NS at 75/hr  Diet: Clear liquid diet . Advance diet as tolerated   Pain regimen: Tylenol 1gm Q 6, Oxy 5mg  PRN _ Required 2 PRNs this AM   Bowel regimen: Dulcolax.

## 2022-04-06 NOTE — PROGRESS NOTE ADULT - SUBJECTIVE AND OBJECTIVE BOX
PROGRESS NOTE:     Patient is a 61y old  Male who presents with a chief complaint of Nephrectomy (05 Apr 2022 14:01)      SUBJECTIVE / OVERNIGHT EVENTS:    ADDITIONAL REVIEW OF SYSTEMS:    MEDICATIONS  (STANDING):  acetaminophen   IVPB .. 1000 milliGRAM(s) IV Intermittent every 6 hours  amLODIPine   Tablet 5 milliGRAM(s) Oral daily  dextrose 5% + sodium chloride 0.45%. 1000 milliLiter(s) (75 mL/Hr) IV Continuous <Continuous>  heparin   Injectable 5000 Unit(s) SubCutaneous every 8 hours  polyethylene glycol 3350 17 Gram(s) Oral daily  potassium chloride    Tablet ER 10 milliEquivalent(s) Oral every 2 hours  senna 1 Tablet(s) Oral daily    MEDICATIONS  (PRN):  oxyCODONE    IR 5 milliGRAM(s) Oral every 4 hours PRN Severe Pain (7 - 10)      CAPILLARY BLOOD GLUCOSE        I&O's Summary    05 Apr 2022 07:01  -  06 Apr 2022 07:00  --------------------------------------------------------  IN: 370 mL / OUT: 2560 mL / NET: -2190 mL    06 Apr 2022 07:01  -  06 Apr 2022 10:29  --------------------------------------------------------  IN: 0 mL / OUT: 530 mL / NET: -530 mL        PHYSICAL EXAM:  Vital Signs Last 24 Hrs  T(C): 36.9 (06 Apr 2022 09:28), Max: 36.9 (06 Apr 2022 09:28)  T(F): 98.5 (06 Apr 2022 09:28), Max: 98.5 (06 Apr 2022 09:28)  HR: 61 (06 Apr 2022 09:28) (61 - 90)  BP: 140/86 (06 Apr 2022 09:28) (103/64 - 145/81)  BP(mean): 71 (05 Apr 2022 14:30) (71 - 97)  RR: 17 (06 Apr 2022 09:28) (12 - 18)  SpO2: 95% (06 Apr 2022 09:28) (95% - 100%)    PHYSICAL EXAM:  GENERAL: NAD, well-developed  HEAD:  Atraumatic, Normocephalic  EYES: EOMI, PERRLA, conjunctiva and sclera clear  NECK: Supple, No JVD  CHEST/LUNG: Clear to auscultation bilaterally; No wheeze  HEART: Regular rate and rhythm; No murmurs, rubs, or gallops  ABDOMEN: Soft,  tender at incision site, dressing present   EXTREMITIES:  2+ Peripheral Pulses, No clubbing, cyanosis, or edema  PSYCH: AAOx3  NEUROLOGY: non-focal  SKIN: No rashes or lesions    LABS:                        13.1   8.14  )-----------( 254      ( 06 Apr 2022 06:45 )             41.4     04-06    140  |  102  |  9   ----------------------------<  99  3.6   |  23  |  0.75    Ca    8.9      06 Apr 2022 06:45                  RADIOLOGY & ADDITIONAL TESTS:  Results Reviewed:   Imaging Personally Reviewed:  Electrocardiogram Personally Reviewed:    COORDINATION OF CARE:  Care Discussed with Consultants/Other Providers [Y/N]:  Prior or Outpatient Records Reviewed [Y/N]:     Dominic Mayberry  Hospitalist  Pager- [phone]    PROGRESS NOTE:     Patient is a 61y old  Male who presents with a chief complaint of Nephrectomy (05 Apr 2022 14:01)      SUBJECTIVE / OVERNIGHT EVENTS: Pt seen and examined by me   Pain at incision site, 8/10, some relief with Pain meds  Also reporting some pain  and stiffness  his neck region- asking for heat packs     ADDITIONAL REVIEW OF SYSTEMS:    MEDICATIONS  (STANDING):  acetaminophen   IVPB .. 1000 milliGRAM(s) IV Intermittent every 6 hours  amLODIPine   Tablet 5 milliGRAM(s) Oral daily  dextrose 5% + sodium chloride 0.45%. 1000 milliLiter(s) (75 mL/Hr) IV Continuous <Continuous>  heparin   Injectable 5000 Unit(s) SubCutaneous every 8 hours  polyethylene glycol 3350 17 Gram(s) Oral daily  potassium chloride    Tablet ER 10 milliEquivalent(s) Oral every 2 hours  senna 1 Tablet(s) Oral daily    MEDICATIONS  (PRN):  oxyCODONE    IR 5 milliGRAM(s) Oral every 4 hours PRN Severe Pain (7 - 10)      CAPILLARY BLOOD GLUCOSE        I&O's Summary    05 Apr 2022 07:01  -  06 Apr 2022 07:00  --------------------------------------------------------  IN: 370 mL / OUT: 2560 mL / NET: -2190 mL    06 Apr 2022 07:01  -  06 Apr 2022 10:29  --------------------------------------------------------  IN: 0 mL / OUT: 530 mL / NET: -530 mL        PHYSICAL EXAM:  Vital Signs Last 24 Hrs  T(C): 36.9 (06 Apr 2022 09:28), Max: 36.9 (06 Apr 2022 09:28)  T(F): 98.5 (06 Apr 2022 09:28), Max: 98.5 (06 Apr 2022 09:28)  HR: 61 (06 Apr 2022 09:28) (61 - 90)  BP: 140/86 (06 Apr 2022 09:28) (103/64 - 145/81)  BP(mean): 71 (05 Apr 2022 14:30) (71 - 97)  RR: 17 (06 Apr 2022 09:28) (12 - 18)  SpO2: 95% (06 Apr 2022 09:28) (95% - 100%)    PHYSICAL EXAM:  GENERAL: NAD, well-developed  NECK:  Soreness at upper shoulder muscles, ROM intac. Scar of previous surgery   EYES: EOMI, PERRLA, conjunctiva and sclera clear  NECK: Supple, No JVD  CHEST/LUNG: Clear to auscultation bilaterally; No wheeze  HEART: Regular rate and rhythm; No murmurs, rubs, or gallops  ABDOMEN: Soft,  tender at incision site, dressing present   EXTREMITIES:  2+ Peripheral Pulses, No clubbing, cyanosis, or edema  PSYCH: AAOx3  NEUROLOGY: non-focal  SKIN: No rashes or lesions    LABS:                        13.1   8.14  )-----------( 254      ( 06 Apr 2022 06:45 )             41.4     04-06    140  |  102  |  9   ----------------------------<  99  3.6   |  23  |  0.75    Ca    8.9      06 Apr 2022 06:45                  RADIOLOGY & ADDITIONAL TESTS:  Results Reviewed:   Imaging Personally Reviewed:  Electrocardiogram Personally Reviewed:    COORDINATION OF CARE:  Care Discussed with Consultants/Other Providers [Y/N]:  Prior or Outpatient Records Reviewed [Y/N]:

## 2022-04-07 ENCOUNTER — TRANSCRIPTION ENCOUNTER (OUTPATIENT)
Age: 62
End: 2022-04-07

## 2022-04-07 PROCEDURE — 99024 POSTOP FOLLOW-UP VISIT: CPT | Mod: AI

## 2022-04-07 PROCEDURE — 99232 SBSQ HOSP IP/OBS MODERATE 35: CPT

## 2022-04-07 RX ORDER — KETOROLAC TROMETHAMINE 30 MG/ML
15 SYRINGE (ML) INJECTION EVERY 6 HOURS
Refills: 0 | Status: COMPLETED | OUTPATIENT
Start: 2022-04-07 | End: 2022-04-08

## 2022-04-07 RX ORDER — ACETAMINOPHEN 500 MG
975 TABLET ORAL EVERY 6 HOURS
Refills: 0 | Status: DISCONTINUED | OUTPATIENT
Start: 2022-04-07 | End: 2022-04-08

## 2022-04-07 RX ORDER — OXYCODONE HYDROCHLORIDE 5 MG/1
10 TABLET ORAL EVERY 4 HOURS
Refills: 0 | Status: DISCONTINUED | OUTPATIENT
Start: 2022-04-07 | End: 2022-04-08

## 2022-04-07 RX ORDER — OXYCODONE HYDROCHLORIDE 5 MG/1
5 TABLET ORAL EVERY 4 HOURS
Refills: 0 | Status: DISCONTINUED | OUTPATIENT
Start: 2022-04-07 | End: 2022-04-08

## 2022-04-07 RX ORDER — POLYETHYLENE GLYCOL 3350 17 G/17G
17 POWDER, FOR SOLUTION ORAL ONCE
Refills: 0 | Status: COMPLETED | OUTPATIENT
Start: 2022-04-07 | End: 2022-04-07

## 2022-04-07 RX ADMIN — HEPARIN SODIUM 5000 UNIT(S): 5000 INJECTION INTRAVENOUS; SUBCUTANEOUS at 13:42

## 2022-04-07 RX ADMIN — Medication 15 MILLIGRAM(S): at 18:31

## 2022-04-07 RX ADMIN — SODIUM CHLORIDE 75 MILLILITER(S): 9 INJECTION, SOLUTION INTRAVENOUS at 20:02

## 2022-04-07 RX ADMIN — OXYCODONE HYDROCHLORIDE 10 MILLIGRAM(S): 5 TABLET ORAL at 22:10

## 2022-04-07 RX ADMIN — OXYCODONE HYDROCHLORIDE 5 MILLIGRAM(S): 5 TABLET ORAL at 04:01

## 2022-04-07 RX ADMIN — AMLODIPINE BESYLATE 5 MILLIGRAM(S): 2.5 TABLET ORAL at 06:15

## 2022-04-07 RX ADMIN — HEPARIN SODIUM 5000 UNIT(S): 5000 INJECTION INTRAVENOUS; SUBCUTANEOUS at 06:15

## 2022-04-07 RX ADMIN — Medication 15 MILLIGRAM(S): at 23:11

## 2022-04-07 RX ADMIN — OXYCODONE HYDROCHLORIDE 10 MILLIGRAM(S): 5 TABLET ORAL at 21:40

## 2022-04-07 RX ADMIN — Medication 15 MILLIGRAM(S): at 11:19

## 2022-04-07 RX ADMIN — POLYETHYLENE GLYCOL 3350 17 GRAM(S): 17 POWDER, FOR SOLUTION ORAL at 01:02

## 2022-04-07 RX ADMIN — POLYETHYLENE GLYCOL 3350 17 GRAM(S): 17 POWDER, FOR SOLUTION ORAL at 11:24

## 2022-04-07 RX ADMIN — SODIUM CHLORIDE 75 MILLILITER(S): 9 INJECTION, SOLUTION INTRAVENOUS at 21:40

## 2022-04-07 RX ADMIN — OXYCODONE HYDROCHLORIDE 10 MILLIGRAM(S): 5 TABLET ORAL at 09:27

## 2022-04-07 RX ADMIN — OXYCODONE HYDROCHLORIDE 5 MILLIGRAM(S): 5 TABLET ORAL at 03:27

## 2022-04-07 RX ADMIN — SENNA PLUS 1 TABLET(S): 8.6 TABLET ORAL at 11:24

## 2022-04-07 RX ADMIN — HEPARIN SODIUM 5000 UNIT(S): 5000 INJECTION INTRAVENOUS; SUBCUTANEOUS at 21:40

## 2022-04-07 RX ADMIN — OXYCODONE HYDROCHLORIDE 10 MILLIGRAM(S): 5 TABLET ORAL at 08:30

## 2022-04-07 NOTE — PROGRESS NOTE ADULT - SUBJECTIVE AND OBJECTIVE BOX
Dominic Mayberry  Hospitalist  Pager- [phone]    PROGRESS NOTE:     Patient is a 61y old  Male who presents with a chief complaint of Nephrectomy (05 Apr 2022 14:01)      SUBJECTIVE / OVERNIGHT EVENTS: Pt seen and examined by me         ADDITIONAL REVIEW OF SYSTEMS:    MEDICATIONS  (STANDING):  amLODIPine   Tablet 5 milliGRAM(s) Oral daily  dextrose 5% + sodium chloride 0.45%. 1000 milliLiter(s) (75 mL/Hr) IV Continuous <Continuous>  heparin   Injectable 5000 Unit(s) SubCutaneous every 8 hours  polyethylene glycol 3350 17 Gram(s) Oral daily  senna 1 Tablet(s) Oral daily    MEDICATIONS  (PRN):  acetaminophen     Tablet .. 975 milliGRAM(s) Oral every 6 hours PRN Mild Pain (1 - 3)  oxyCODONE    IR 10 milliGRAM(s) Oral every 4 hours PRN Severe Pain (7 - 10)  oxyCODONE    IR 5 milliGRAM(s) Oral every 4 hours PRN Moderate Pain (4 - 6)  CAPILLARY BLOOD GLUCOSE        I&O's Summary    05 Apr 2022 07:01  -  06 Apr 2022 07:00  --------------------------------------------------------  IN: 370 mL / OUT: 2560 mL / NET: -2190 mL    06 Apr 2022 07:01  -  06 Apr 2022 10:29  --------------------------------------------------------  IN: 0 mL / OUT: 530 mL / NET: -530 mL        PHYSICAL EXAM:  Vital Signs Last 24 Hrs  T(C): 36.9 (07 Apr 2022 08:36), Max: 37.1 (07 Apr 2022 01:49)  T(F): 98.5 (07 Apr 2022 08:36), Max: 98.7 (07 Apr 2022 01:49)  HR: 63 (07 Apr 2022 08:36) (58 - 70)  BP: 152/80 (07 Apr 2022 08:36) (124/69 - 166/89)  BP(mean): --  RR: 18 (07 Apr 2022 08:36) (17 - 18)  SpO2: 98% (07 Apr 2022 08:36) (95% - 98%)    PHYSICAL EXAM:  GENERAL: NAD, well-developed  NECK:  Soreness at upper shoulder muscles, ROM intac. Scar of previous surgery   EYES: EOMI, PERRLA, conjunctiva and sclera clear  NECK: Supple, No JVD  CHEST/LUNG: Clear to auscultation bilaterally; No wheeze  HEART: Regular rate and rhythm; No murmurs, rubs, or gallops  ABDOMEN: Soft,  tender at incision site, dressing present   EXTREMITIES:  2+ Peripheral Pulses, No clubbing, cyanosis, or edema  PSYCH: AAOx3  NEUROLOGY: non-focal  SKIN: No rashes or lesions    LABS:                                              13.1   8.14  )-----------( 254      ( 06 Apr 2022 06:45 )             41.4     04-06    140  |  102  |  9   ----------------------------<  99  3.6   |  23  |  0.75    Ca    8.9      06 Apr 2022 06:45          RADIOLOGY & ADDITIONAL TESTS:  Results Reviewed:   Imaging Personally Reviewed:  Electrocardiogram Personally Reviewed:    COORDINATION OF CARE:  Care Discussed with Consultants/Other Providers [Y/N]: Urology ACP  Prior or Outpatient Records Reviewed [Y/N]:     Dominic Mayberry  Hospitalist  Pager- [phone]    PROGRESS NOTE:     Patient is a 61y old  Male who presents with a chief complaint of Nephrectomy (05 Apr 2022 14:01)      SUBJECTIVE / OVERNIGHT EVENTS: Pt seen and examined by me   Reports improvement in abdominal pain, Pain 8-9/10, decreases to 6-7/10 post pain meds  Also improvement in neck pain   Not passed gas yet.  Was offered a suppository but is refusing as he had a bad experience in the past with a ?enema? rectal exam         ADDITIONAL REVIEW OF SYSTEMS:    MEDICATIONS  (STANDING):  amLODIPine   Tablet 5 milliGRAM(s) Oral daily  dextrose 5% + sodium chloride 0.45%. 1000 milliLiter(s) (75 mL/Hr) IV Continuous <Continuous>  heparin   Injectable 5000 Unit(s) SubCutaneous every 8 hours  polyethylene glycol 3350 17 Gram(s) Oral daily  senna 1 Tablet(s) Oral daily    MEDICATIONS  (PRN):  acetaminophen     Tablet .. 975 milliGRAM(s) Oral every 6 hours PRN Mild Pain (1 - 3)  oxyCODONE    IR 10 milliGRAM(s) Oral every 4 hours PRN Severe Pain (7 - 10)  oxyCODONE    IR 5 milliGRAM(s) Oral every 4 hours PRN Moderate Pain (4 - 6)  CAPILLARY BLOOD GLUCOSE        I&O's Summary    05 Apr 2022 07:01  -  06 Apr 2022 07:00  --------------------------------------------------------  IN: 370 mL / OUT: 2560 mL / NET: -2190 mL    06 Apr 2022 07:01  -  06 Apr 2022 10:29  --------------------------------------------------------  IN: 0 mL / OUT: 530 mL / NET: -530 mL        PHYSICAL EXAM:  Vital Signs Last 24 Hrs  T(C): 36.9 (07 Apr 2022 08:36), Max: 37.1 (07 Apr 2022 01:49)  T(F): 98.5 (07 Apr 2022 08:36), Max: 98.7 (07 Apr 2022 01:49)  HR: 63 (07 Apr 2022 08:36) (58 - 70)  BP: 152/80 (07 Apr 2022 08:36) (124/69 - 166/89)  BP(mean): --  RR: 18 (07 Apr 2022 08:36) (17 - 18)  SpO2: 98% (07 Apr 2022 08:36) (95% - 98%)    PHYSICAL EXAM:  GENERAL: NAD, well-developed  NECK:  Soreness at upper shoulder muscles, ROM intac. Scar of previous surgery   EYES: EOMI, PERRLA, conjunctiva and sclera clear  NECK: Supple, No JVD  CHEST/LUNG: Clear to auscultation bilaterally; No wheeze  HEART: Regular rate and rhythm; No murmurs, rubs, or gallops  ABDOMEN: Soft,  tender at incision site, dressing present   EXTREMITIES:  2+ Peripheral Pulses, No clubbing, cyanosis, or edema  PSYCH: AAOx3  NEUROLOGY: non-focal  SKIN: No rashes or lesions    LABS:                                              13.1   8.14  )-----------( 254      ( 06 Apr 2022 06:45 )             41.4     04-06    140  |  102  |  9   ----------------------------<  99  3.6   |  23  |  0.75    Ca    8.9      06 Apr 2022 06:45          RADIOLOGY & ADDITIONAL TESTS:  Results Reviewed:   Imaging Personally Reviewed:  Electrocardiogram Personally Reviewed:    COORDINATION OF CARE:  Care Discussed with Consultants/Other Providers [Y/N]: Urology ACP  Prior or Outpatient Records Reviewed [Y/N]:

## 2022-04-07 NOTE — DISCHARGE NOTE NURSING/CASE MANAGEMENT/SOCIAL WORK - PATIENT PORTAL LINK FT
You can access the FollowMyHealth Patient Portal offered by [facility] by registering at the following website: [email or web address]. By joining [employer]’s FollowMyHealth portal, you will also be able to view your health information using other applications (apps) compatible with our system.

## 2022-04-07 NOTE — PROGRESS NOTE ADULT - PROBLEM SELECTOR PLAN 1
s/p robotic assisted laparoscopic right partial nephrectomy on 4/5/2022  On IVF- D51/2 NS at 75/hr  Diet: Clear liquid diet . Advance diet as tolerated   Pain regimen: Tylenol 1gm Q 6, Oxy 5mg   and 10mg PRN  Required 1 PRN of 10mg this AM   Bowel regimen: Senna, miralax s/p robotic assisted laparoscopic right partial nephrectomy on 4/5/2022  On IVF- D51/2 NS at 75/hr  Diet: Clear liquid diet . Advance diet as tolerated   Pain regimen: Tylenol 1gm Q 6, Oxy 5mg   and 10mg PRN  Required 1 PRN of 10mg this AM   on Ketorolac 15mg Q 6 ATC x 6 doses  Bowel regimen: Senna, miralax

## 2022-04-07 NOTE — DISCHARGE NOTE NURSING/CASE MANAGEMENT/SOCIAL WORK - NSDCPECAREGIVERED_GEN_ALL_CORE
Medline and carenotes for surgical procedure Nael Asst partial nephrectomy, Incision care, Tylenol, Oxycodone,  as well as DC Medications and side effects literature for patient reference. Lap partial nephrectomy  oxy ir

## 2022-04-07 NOTE — DISCHARGE NOTE NURSING/CASE MANAGEMENT/SOCIAL WORK - NSDPDISTO_GEN_ALL_CORE
Pt with incisions CDI steri strips to scope sites, Voiding, VS stable Afebrile. pt with positive bowel sounds/Skilled Nursing Facility Pt with incisions CDI steri strips to scope sites, Voiding, VS stable Afebrile. pt with positive bowel sounds/Home

## 2022-04-07 NOTE — DISCHARGE NOTE NURSING/CASE MANAGEMENT/SOCIAL WORK - NSDCPEFALRISK_GEN_ALL_CORE
For information on Fall & Injury Prevention, visit: [email or web address] OR  [email or web address] OR  https://www.cdc.gov/steadi/patient.html

## 2022-04-07 NOTE — PROGRESS NOTE ADULT - SUBJECTIVE AND OBJECTIVE BOX
Overnight events:  None    Subjective:  Pt offers no complaints, tolerating CLD, no N/V, no flatus or BM yet    Objective:    Vital signs  T(C): , Max: 37.1 (04-07-22 @ 01:49)  HR: 66 (04-07-22 @ 06:05)  BP: 142/89 (04-07-22 @ 06:05)  SpO2: 96% (04-07-22 @ 06:05)  Wt(kg): --    Output   Void; 800  SAAD: 32.5 (102.5/24hr)  04-06 @ 07:01  -  04-07 @ 07:00  --------------------------------------------------------  IN: 0 mL / OUT: 3252.5 mL / NET: -3252.5 mL        Gen: NAD  Abd: steris c/d/i, softly distended, nontender      Labs: none today                        13.1   8.14  )-----------( 254      ( 06 Apr 2022 06:45 )             41.4     06 Apr 2022 06:45    140    |  102    |  9      ----------------------------<  99     3.6     |  23     |  0.75     Ca    8.9        06 Apr 2022 06:45

## 2022-04-08 ENCOUNTER — TRANSCRIPTION ENCOUNTER (OUTPATIENT)
Age: 62
End: 2022-04-08

## 2022-04-08 VITALS
SYSTOLIC BLOOD PRESSURE: 140 MMHG | DIASTOLIC BLOOD PRESSURE: 82 MMHG | HEART RATE: 78 BPM | RESPIRATION RATE: 16 BRPM | OXYGEN SATURATION: 97 % | TEMPERATURE: 99 F

## 2022-04-08 PROCEDURE — 99232 SBSQ HOSP IP/OBS MODERATE 35: CPT

## 2022-04-08 RX ORDER — ACETAMINOPHEN 500 MG
3 TABLET ORAL
Qty: 0 | Refills: 0 | DISCHARGE
Start: 2022-04-08

## 2022-04-08 RX ADMIN — Medication 15 MILLIGRAM(S): at 05:24

## 2022-04-08 RX ADMIN — HEPARIN SODIUM 5000 UNIT(S): 5000 INJECTION INTRAVENOUS; SUBCUTANEOUS at 13:32

## 2022-04-08 RX ADMIN — HEPARIN SODIUM 5000 UNIT(S): 5000 INJECTION INTRAVENOUS; SUBCUTANEOUS at 05:24

## 2022-04-08 RX ADMIN — Medication 15 MILLIGRAM(S): at 11:50

## 2022-04-08 RX ADMIN — AMLODIPINE BESYLATE 5 MILLIGRAM(S): 2.5 TABLET ORAL at 05:24

## 2022-04-08 RX ADMIN — SENNA PLUS 1 TABLET(S): 8.6 TABLET ORAL at 11:50

## 2022-04-08 RX ADMIN — POLYETHYLENE GLYCOL 3350 17 GRAM(S): 17 POWDER, FOR SOLUTION ORAL at 11:50

## 2022-04-08 NOTE — PROGRESS NOTE ADULT - PROBLEM SELECTOR PLAN 1
s/p robotic assisted laparoscopic right partial nephrectomy on 4/5/2022  On IVF- D51/2 NS at 75/hr  Diet: Clear liquid diet . Advance diet as tolerated   Pain regimen: Tylenol  Q 6, Oxy 5mg   and 10mg PRN  Required 2 PRN of 10mg on 4/7. None this AM  on Ketorolac 15mg Q 6 ATC x 6 doses  Bowel regimen: Senna, miralax

## 2022-04-08 NOTE — PROGRESS NOTE ADULT - PROBLEM SELECTOR PROBLEM 4
Need for prophylactic measure
Need for prophylactic measure
Constitutional: no fevers; no chills  HEENT: no visual changes, no sore throat, no rhinorrhea  CV: no cp; no palpitations  Resp: no sob; no cough  GI: abd pain, nausea, no vomiting, no diarrhea, no constipation  : no dysuria, no hematuria  MSK: no myalgais; no arthralgias  skin: no rashes  neuro: no HA, no numbness; no weakness, no tingling  ROS statement: all other ROS negative except as per HPI
Need for prophylactic measure

## 2022-04-08 NOTE — DISCHARGE NOTE PROVIDER - CARE PROVIDER_API CALL
Lizett Jackman; MPH)  Urology  [address] [facility] [address] [place] [address]  Phone: [phone]  Fax: [phone]  Follow Up Time:

## 2022-04-08 NOTE — DISCHARGE NOTE PROVIDER - NSDCCPCAREPLAN_GEN_ALL_CORE_FT
PRINCIPAL DISCHARGE DIAGNOSIS  Diagnosis: Right renal mass  Assessment and Plan of Treatment: Drink plenty of fluids.  No heavy lifting (greater than 10 pounds) or straining for 4 to 6 weeks.  You may shower, just pat white strips dry, they will fall off in a few weeks. Change dressing at drain site daily or as needed until dry, then may remove.  Call 's  office  to schedule a follow up appointment.  Call the office if you have fever greater than 101, difficulty urinating, pain not relieved with pain medication, nausea/vomiting..
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
## 2022-04-08 NOTE — DISCHARGE NOTE PROVIDER - NSDCMRMEDTOKEN_GEN_ALL_CORE_FT
acetaminophen 325 mg oral tablet: 3 tab(s) orally every 6 hours, As needed, Mild Pain (1 - 3)  amLODIPine 5 mg oral tablet: 1 tab(s) orally once a day

## 2022-04-08 NOTE — PROGRESS NOTE ADULT - SUBJECTIVE AND OBJECTIVE BOX
Dominic Mayberry  Hospitalist  Pager- [phone]    PROGRESS NOTE:     Patient is a 61y old  Male who presents with a chief complaint of Nephrectomy (05 Apr 2022 14:01)      SUBJECTIVE / OVERNIGHT EVENTS: Pt seen and examined by me   Passing gas-, started passing last night and this AM  Reports improvement in abdominal pain  Also improvement in neck pain         ADDITIONAL REVIEW OF SYSTEMS:    MEDICATIONS  (STANDING):  amLODIPine   Tablet 5 milliGRAM(s) Oral daily  dextrose 5% + sodium chloride 0.45%. 1000 milliLiter(s) (75 mL/Hr) IV Continuous <Continuous>  heparin   Injectable 5000 Unit(s) SubCutaneous every 8 hours  ketorolac   Injectable 15 milliGRAM(s) IV Push every 6 hours  polyethylene glycol 3350 17 Gram(s) Oral daily  senna 1 Tablet(s) Oral daily    MEDICATIONS  (PRN):  acetaminophen     Tablet .. 975 milliGRAM(s) Oral every 6 hours PRN Mild Pain (1 - 3)  oxyCODONE    IR 10 milliGRAM(s) Oral every 4 hours PRN Severe Pain (7 - 10)  oxyCODONE    IR 5 milliGRAM(s) Oral every 4 hours PRN Moderate Pain (4 - 6)  CAPILLARY BLOOD GLUCOSE        I&O's Summary    05 Apr 2022 07:01  -  06 Apr 2022 07:00  --------------------------------------------------------  IN: 370 mL / OUT: 2560 mL / NET: -2190 mL    06 Apr 2022 07:01  -  06 Apr 2022 10:29  --------------------------------------------------------  IN: 0 mL / OUT: 530 mL / NET: -530 mL        PHYSICAL EXAM:    Vital Signs Last 24 Hrs  T(C): 36.7 (08 Apr 2022 10:34), Max: 37.7 (07 Apr 2022 18:06)  T(F): 98.1 (08 Apr 2022 10:34), Max: 99.9 (07 Apr 2022 18:06)  HR: 69 (08 Apr 2022 10:34) (57 - 76)  BP: 129/76 (08 Apr 2022 10:34) (111/75 - 149/90)  BP(mean): --  RR: 17 (08 Apr 2022 10:34) (17 - 18)  SpO2: 97% (08 Apr 2022 10:34) (94% - 97%)    PHYSICAL EXAM:  GENERAL: NAD, well-developed  NECK:  Soreness at upper shoulder muscles improving. Scar of previous surgery   EYES: EOMI, PERRLA, conjunctiva and sclera clear  NECK: Supple, No JVD  CHEST/LUNG: Clear to auscultation bilaterally; No wheeze  HEART: Regular rate and rhythm; No murmurs, rubs, or gallops  ABDOMEN: Soft,  tender at incision site improving , dressing present   EXTREMITIES:  2+ Peripheral Pulses, No clubbing, cyanosis, or edema  PSYCH: AAOx3  NEUROLOGY: non-focal  SKIN: No rashes or lesions    LABS:                                          RADIOLOGY & ADDITIONAL TESTS:  Results Reviewed:   Imaging Personally Reviewed:  Electrocardiogram Personally Reviewed:    COORDINATION OF CARE:  Care Discussed with Consultants/Other Providers [Y/N]: Urology ACP  Prior or Outpatient Records Reviewed [Y/N]:

## 2022-04-08 NOTE — PROGRESS NOTE ADULT - SUBJECTIVE AND OBJECTIVE BOX
POD #3  Afeb 140/89 62 96%RA    Pt has no c/o  Abd- soft NT ND   + flatus; wounds C&D  Void - 650  SAAD 60

## 2022-04-08 NOTE — DISCHARGE NOTE PROVIDER - CARE PROVIDERS DIRECT ADDRESSES
[email or web address]

## 2022-04-08 NOTE — DISCHARGE NOTE PROVIDER - HOSPITAL COURSE
Pt had RAL R. partial nephrectomy; had a lot of pain, and did not pass gas initially;  doing better now; voiding well; SAAD removed; pain is controlled; labs stable; ambulating; passed gas and reagan reg diet; home today.

## 2022-04-08 NOTE — PROGRESS NOTE ADULT - PROBLEM SELECTOR PLAN 2
SBP in 140s  Home med - Norvasc  Continue Norvasc 5mg.
SBP in 120s-130s  Clarified Home med - pt takes Amlodipine/ Benazepril at home   Continue Norvasc 5mg.
SBP in 140s  Home med - Norvasc  Continue Norvasc 5mg.

## 2022-04-11 PROBLEM — G47.30 SLEEP APNEA, UNSPECIFIED: Chronic | Status: ACTIVE | Noted: 2022-03-22

## 2022-04-11 PROBLEM — N28.89 OTHER SPECIFIED DISORDERS OF KIDNEY AND URETER: Chronic | Status: ACTIVE | Noted: 2022-03-22

## 2022-04-11 PROBLEM — E66.01 MORBID (SEVERE) OBESITY DUE TO EXCESS CALORIES: Chronic | Status: ACTIVE | Noted: 2017-07-27

## 2022-04-13 LAB — SURGICAL PATHOLOGY STUDY: SIGNIFICANT CHANGE UP

## 2022-04-15 ENCOUNTER — TRANSCRIPTION ENCOUNTER (OUTPATIENT)
Age: 62
End: 2022-04-15

## 2022-04-20 ENCOUNTER — APPOINTMENT (OUTPATIENT)
Dept: UROLOGY | Facility: CLINIC | Age: 62
End: 2022-04-20
Payer: COMMERCIAL

## 2022-04-20 VITALS
BODY MASS INDEX: 31.4 KG/M2 | RESPIRATION RATE: 16 BRPM | TEMPERATURE: 97.8 F | WEIGHT: 212 LBS | HEART RATE: 96 BPM | SYSTOLIC BLOOD PRESSURE: 152 MMHG | DIASTOLIC BLOOD PRESSURE: 97 MMHG | OXYGEN SATURATION: 82 % | HEIGHT: 69 IN

## 2022-04-20 PROCEDURE — 99024 POSTOP FOLLOW-UP VISIT: CPT

## 2022-04-20 NOTE — PHYSICAL EXAM
[General Appearance - Well Developed] : well developed [General Appearance - Well Nourished] : well nourished [Normal Appearance] : normal appearance [Well Groomed] : well groomed [General Appearance - In No Acute Distress] : no acute distress [Abdomen Soft] : soft [Abdomen Tenderness] : non-tender [Costovertebral Angle Tenderness] : no ~M costovertebral angle tenderness [Urethral Meatus] : meatus normal [Penis Abnormality] : normal circumcised penis [Urinary Bladder Findings] : the bladder was normal on palpation [Scrotum] : the scrotum was normal [Testes Tenderness] : no tenderness of the testes [Testes Mass (___cm)] : there were no testicular masses [Prostate Tenderness] : the prostate was not tender [No Prostate Nodules] : no prostate nodules [Prostate Size ___ gm] : prostate size [unfilled] gm [FreeTextEntry1] : DEFERRED TODAY [Edema] : no peripheral edema [] : no respiratory distress [Respiration, Rhythm And Depth] : normal respiratory rhythm and effort [Exaggerated Use Of Accessory Muscles For Inspiration] : no accessory muscle use [Oriented To Time, Place, And Person] : oriented to person, place, and time [Affect] : the affect was normal [Mood] : the mood was normal [Not Anxious] : not anxious [Normal Station and Gait] : the gait and station were normal for the patient's age [No Focal Deficits] : no focal deficits [No Palpable Adenopathy] : no palpable adenopathy

## 2022-04-20 NOTE — HISTORY OF PRESENT ILLNESS
[FreeTextEntry1] : 59 yo M recently hospitalized for jaundice, fever and abdominal pain\par Went to PCP who directed him to ER\par Workup revealed cholelithiasis and pt is pending cholecystectomy\par MRCP also showed a slightly enhancing right renal mass 1.5cm\par no bothersome LUTS usually but does have some urinary hesitancy\par no dysuria but does have history of microhematuria\par Drinks 3-4 large cups of water, 1-2 cups of coffee daily\par no prior prostate problems\par \par 7/28/21 Interval history; s/p lap tomi with no oissues\par Had recent abdominal MRI and here to review results\par has been having some left sided pain intermittently \par Of note, renal mass is on the right side\par \par 4/20/22 Interval history: Pt is s/p rigth partial nephrectomy\par Doing well since discharge\par No urinary issues\par Normal bowel movements\par Some neck issues after surgery but much improved\par 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

## 2022-04-25 LAB
ANION GAP SERPL CALC-SCNC: 12 MMOL/L
BASOPHILS # BLD AUTO: 0.05 K/UL
BASOPHILS NFR BLD AUTO: 0.9 %
BUN SERPL-MCNC: 9 MG/DL
CALCIUM SERPL-MCNC: 10.1 MG/DL
CHLORIDE SERPL-SCNC: 104 MMOL/L
CO2 SERPL-SCNC: 28 MMOL/L
CREAT SERPL-MCNC: 0.86 MG/DL
EGFR: 99 ML/MIN/1.73M2
EOSINOPHIL # BLD AUTO: 0.17 K/UL
EOSINOPHIL NFR BLD AUTO: 3.1 %
GLUCOSE SERPL-MCNC: 95 MG/DL
HCT VFR BLD CALC: 49 %
HGB BLD-MCNC: 15.2 G/DL
IMM GRANULOCYTES NFR BLD AUTO: 0.5 %
LYMPHOCYTES # BLD AUTO: 3.17 K/UL
LYMPHOCYTES NFR BLD AUTO: 57.2 %
MAN DIFF?: NORMAL
MCHC RBC-ENTMCNC: 24.8 PG
MCHC RBC-ENTMCNC: 31 GM/DL
MCV RBC AUTO: 79.8 FL
MONOCYTES # BLD AUTO: 0.41 K/UL
MONOCYTES NFR BLD AUTO: 7.4 %
NEUTROPHILS # BLD AUTO: 1.71 K/UL
NEUTROPHILS NFR BLD AUTO: 30.9 %
PLATELET # BLD AUTO: 353 K/UL
POTASSIUM SERPL-SCNC: 4.9 MMOL/L
RBC # BLD: 6.14 M/UL
RBC # FLD: 16.1 %
SODIUM SERPL-SCNC: 144 MMOL/L
WBC # FLD AUTO: 5.54 K/UL

## 2022-05-04 ENCOUNTER — TRANSCRIPTION ENCOUNTER (OUTPATIENT)
Age: 62
End: 2022-05-04

## 2022-07-05 ENCOUNTER — TRANSCRIPTION ENCOUNTER (OUTPATIENT)
Age: 62
End: 2022-07-05

## 2022-07-09 ENCOUNTER — APPOINTMENT (OUTPATIENT)
Dept: CT IMAGING | Facility: IMAGING CENTER | Age: 62
End: 2022-07-09

## 2022-07-12 ENCOUNTER — APPOINTMENT (OUTPATIENT)
Dept: MRI IMAGING | Facility: CLINIC | Age: 62
End: 2022-07-12

## 2022-07-12 ENCOUNTER — OUTPATIENT (OUTPATIENT)
Dept: OUTPATIENT SERVICES | Facility: HOSPITAL | Age: 62
LOS: 1 days | End: 2022-07-12
Payer: COMMERCIAL

## 2022-07-12 DIAGNOSIS — N28.89 OTHER SPECIFIED DISORDERS OF KIDNEY AND URETER: ICD-10-CM

## 2022-07-12 DIAGNOSIS — Z90.49 ACQUIRED ABSENCE OF OTHER SPECIFIED PARTS OF DIGESTIVE TRACT: Chronic | ICD-10-CM

## 2022-07-12 DIAGNOSIS — Z90.3 ACQUIRED ABSENCE OF STOMACH [PART OF]: Chronic | ICD-10-CM

## 2022-07-12 DIAGNOSIS — Z00.8 ENCOUNTER FOR OTHER GENERAL EXAMINATION: ICD-10-CM

## 2022-07-12 DIAGNOSIS — Z98.1 ARTHRODESIS STATUS: Chronic | ICD-10-CM

## 2022-07-12 PROCEDURE — 74183 MRI ABD W/O CNTR FLWD CNTR: CPT

## 2022-07-12 PROCEDURE — 74183 MRI ABD W/O CNTR FLWD CNTR: CPT | Mod: 26

## 2022-07-12 PROCEDURE — A9585: CPT

## 2022-07-20 ENCOUNTER — APPOINTMENT (OUTPATIENT)
Age: 62
End: 2022-07-20

## 2022-07-20 VITALS
RESPIRATION RATE: 16 BRPM | DIASTOLIC BLOOD PRESSURE: 86 MMHG | HEART RATE: 65 BPM | SYSTOLIC BLOOD PRESSURE: 143 MMHG | TEMPERATURE: 97.8 F | OXYGEN SATURATION: 98 %

## 2022-07-20 PROCEDURE — 99213 OFFICE O/P EST LOW 20 MIN: CPT

## 2022-07-24 NOTE — HISTORY OF PRESENT ILLNESS
[FreeTextEntry1] : 59 yo M recently hospitalized for jaundice, fever and abdominal pain\par Went to PCP who directed him to ER\par Workup revealed cholelithiasis and pt is pending cholecystectomy\par MRCP also showed a slightly enhancing right renal mass 1.5cm\par no bothersome LUTS usually but does have some urinary hesitancy\par no dysuria but does have history of microhematuria\par Drinks 3-4 large cups of water, 1-2 cups of coffee daily\par no prior prostate problems\par \par 7/28/21 Interval history; s/p lap tomi with no oissues\par Had recent abdominal MRI and here to review results\par has been having some left sided pain intermittently \par Of note, renal mass is on the right side\par \par 4/20/22 Interval history: Pt is s/p right partial nephrectomy\par Doing well since discharge\par No urinary issues\par Normal bowel movements\par Some neck issues after surgery but much improved\par 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

## 2022-07-24 NOTE — HISTORY OF PRESENT ILLNESS
[FreeTextEntry1] : 61 yo M recently hospitalized for jaundice, fever and abdominal pain\par Went to PCP who directed him to ER\par Workup revealed cholelithiasis and pt is pending cholecystectomy\par MRCP also showed a slightly enhancing right renal mass 1.5cm\par no bothersome LUTS usually but does have some urinary hesitancy\par no dysuria but does have history of microhematuria\par Drinks 3-4 large cups of water, 1-2 cups of coffee daily\par no prior prostate problems\par \par 7/28/21 Interval history; s/p lap tomi with no oissues\par Had recent abdominal MRI and here to review results\par has been having some left sided pain intermittently \par Of note, renal mass is on the right side\par \par 4/20/22 Interval history: Pt is s/p right partial nephrectomy\par Doing well since discharge\par No urinary issues\par Normal bowel movements\par Some neck issues after surgery but much improved\par 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

## 2022-07-24 NOTE — PHYSICAL EXAM
[General Appearance - Well Developed] : well developed [General Appearance - Well Nourished] : well nourished [Normal Appearance] : normal appearance [Well Groomed] : well groomed [General Appearance - In No Acute Distress] : no acute distress [Abdomen Tenderness] : non-tender [Abdomen Soft] : soft [Costovertebral Angle Tenderness] : no ~M costovertebral angle tenderness [Penis Abnormality] : normal circumcised penis [Urethral Meatus] : meatus normal [Urinary Bladder Findings] : the bladder was normal on palpation [Testes Tenderness] : no tenderness of the testes [Scrotum] : the scrotum was normal [Testes Mass (___cm)] : there were no testicular masses [Prostate Tenderness] : the prostate was not tender [No Prostate Nodules] : no prostate nodules [Prostate Size ___ gm] : prostate size [unfilled] gm [FreeTextEntry1] : DEFERRED TODAY [Edema] : no peripheral edema [] : no respiratory distress [Exaggerated Use Of Accessory Muscles For Inspiration] : no accessory muscle use [Respiration, Rhythm And Depth] : normal respiratory rhythm and effort [Oriented To Time, Place, And Person] : oriented to person, place, and time [Affect] : the affect was normal [Not Anxious] : not anxious [Mood] : the mood was normal [Normal Station and Gait] : the gait and station were normal for the patient's age [No Focal Deficits] : no focal deficits [No Palpable Adenopathy] : no palpable adenopathy

## 2022-07-24 NOTE — ASSESSMENT
[FreeTextEntry1] : 60 yo M with RCC\par \par - Reviewed MRI from 7/12/22 through PACS and confirmed no evidence of recurrence\par - FU in 6 months

## 2022-07-24 NOTE — PHYSICAL EXAM
[General Appearance - Well Nourished] : well nourished [General Appearance - Well Developed] : well developed [Normal Appearance] : normal appearance [Well Groomed] : well groomed [General Appearance - In No Acute Distress] : no acute distress [Abdomen Soft] : soft [Abdomen Tenderness] : non-tender [Costovertebral Angle Tenderness] : no ~M costovertebral angle tenderness [Urethral Meatus] : meatus normal [Penis Abnormality] : normal circumcised penis [Urinary Bladder Findings] : the bladder was normal on palpation [Testes Tenderness] : no tenderness of the testes [Scrotum] : the scrotum was normal [Testes Mass (___cm)] : there were no testicular masses [Prostate Tenderness] : the prostate was not tender [No Prostate Nodules] : no prostate nodules [Prostate Size ___ gm] : prostate size [unfilled] gm [FreeTextEntry1] : DEFERRED TODAY [Edema] : no peripheral edema [] : no respiratory distress [Exaggerated Use Of Accessory Muscles For Inspiration] : no accessory muscle use [Respiration, Rhythm And Depth] : normal respiratory rhythm and effort [Oriented To Time, Place, And Person] : oriented to person, place, and time [Affect] : the affect was normal [Mood] : the mood was normal [Not Anxious] : not anxious [Normal Station and Gait] : the gait and station were normal for the patient's age [No Focal Deficits] : no focal deficits [No Palpable Adenopathy] : no palpable adenopathy

## 2022-07-27 ENCOUNTER — APPOINTMENT (OUTPATIENT)
Dept: UROLOGY | Facility: CLINIC | Age: 62
End: 2022-07-27

## 2022-10-22 NOTE — PATIENT PROFILE ADULT. - PRO INTERPRETER NEED 2
I have reviewed and agree with resident's findings and plan as documented in encounter.    Claudia Arrington MD   English

## 2022-11-27 NOTE — PATIENT PROFILE ADULT - FUNCTIONAL ASSESSMENT - BASIC MOBILITY SECTION LABEL
Begin taking Augmentin, 1 tablet twice daily for 10 days for sinus infection.    Continue supportive cares such as saline nasal rinses, Mucinex, over-the-counter cold or flu preparations.    Follow-up if symptoms are not improving or new/worsening symptoms develop.  
.

## 2022-12-19 ENCOUNTER — TRANSCRIPTION ENCOUNTER (OUTPATIENT)
Age: 62
End: 2022-12-19

## 2022-12-28 ENCOUNTER — TRANSCRIPTION ENCOUNTER (OUTPATIENT)
Age: 62
End: 2022-12-28

## 2023-01-25 ENCOUNTER — APPOINTMENT (OUTPATIENT)
Age: 63
End: 2023-01-25
Payer: COMMERCIAL

## 2023-01-25 VITALS
RESPIRATION RATE: 16 BRPM | SYSTOLIC BLOOD PRESSURE: 134 MMHG | OXYGEN SATURATION: 96 % | TEMPERATURE: 98 F | HEART RATE: 71 BPM | DIASTOLIC BLOOD PRESSURE: 89 MMHG

## 2023-01-25 PROCEDURE — 99214 OFFICE O/P EST MOD 30 MIN: CPT

## 2023-02-05 NOTE — HISTORY OF PRESENT ILLNESS
[FreeTextEntry1] : 59 yo M recently hospitalized for jaundice, fever and abdominal pain\par Went to PCP who directed him to ER\par Workup revealed cholelithiasis and pt is pending cholecystectomy\par MRCP also showed a slightly enhancing right renal mass 1.5cm\par no bothersome LUTS usually but does have some urinary hesitancy\par no dysuria but does have history of microhematuria\par Drinks 3-4 large cups of water, 1-2 cups of coffee daily\par no prior prostate problems\par \par 7/28/21 Interval history; s/p lap tomi with no oissues\par Had recent abdominal MRI and here to review results\par has been having some left sided pain intermittently \par Of note, renal mass is on the right side\par \par 4/20/22 Interval history: Pt is s/p right partial nephrectomy\par Doing well since discharge\par No urinary issues\par Normal bowel movements\par Some neck issues after surgery but much improved\par 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

## 2023-02-05 NOTE — PHYSICAL EXAM
[General Appearance - Well Developed] : well developed [General Appearance - Well Nourished] : well nourished [Normal Appearance] : normal appearance [Well Groomed] : well groomed [General Appearance - In No Acute Distress] : no acute distress [Abdomen Soft] : soft [Abdomen Tenderness] : non-tender [Costovertebral Angle Tenderness] : no ~M costovertebral angle tenderness [Urethral Meatus] : meatus normal [Penis Abnormality] : normal circumcised penis [Urinary Bladder Findings] : the bladder was normal on palpation [Scrotum] : the scrotum was normal [Testes Tenderness] : no tenderness of the testes [Testes Mass (___cm)] : there were no testicular masses [Prostate Tenderness] : the prostate was not tender [No Prostate Nodules] : no prostate nodules [Prostate Size ___ gm] : prostate size [unfilled] gm [Edema] : no peripheral edema [] : no respiratory distress [Respiration, Rhythm And Depth] : normal respiratory rhythm and effort [Exaggerated Use Of Accessory Muscles For Inspiration] : no accessory muscle use [Oriented To Time, Place, And Person] : oriented to person, place, and time [Affect] : the affect was normal [Mood] : the mood was normal [Not Anxious] : not anxious [Normal Station and Gait] : the gait and station were normal for the patient's age [No Focal Deficits] : no focal deficits [No Palpable Adenopathy] : no palpable adenopathy [FreeTextEntry1] : incisions c/d/i

## 2023-02-05 NOTE — ASSESSMENT
[FreeTextEntry1] : 63 yo M with history of renal cancer presents with flank pain\par \par - PVR = 0ml\par - Renal US\par - BMP\par

## 2023-02-15 ENCOUNTER — APPOINTMENT (OUTPATIENT)
Age: 63
End: 2023-02-15
Payer: COMMERCIAL

## 2023-02-15 LAB
ANION GAP SERPL CALC-SCNC: 13 MMOL/L
BUN SERPL-MCNC: 16 MG/DL
CALCIUM SERPL-MCNC: 10 MG/DL
CHLORIDE SERPL-SCNC: 104 MMOL/L
CO2 SERPL-SCNC: 25 MMOL/L
CREAT SERPL-MCNC: 0.8 MG/DL
EGFR: 101 ML/MIN/1.73M2
GLUCOSE SERPL-MCNC: 86 MG/DL
POTASSIUM SERPL-SCNC: 3.9 MMOL/L
SODIUM SERPL-SCNC: 142 MMOL/L

## 2023-02-15 PROCEDURE — 76775 US EXAM ABDO BACK WALL LIM: CPT

## 2023-03-10 ENCOUNTER — TRANSCRIPTION ENCOUNTER (OUTPATIENT)
Age: 63
End: 2023-03-10

## 2023-03-15 ENCOUNTER — APPOINTMENT (OUTPATIENT)
Dept: UROLOGY | Facility: CLINIC | Age: 63
End: 2023-03-15

## 2023-03-15 ENCOUNTER — NON-APPOINTMENT (OUTPATIENT)
Age: 63
End: 2023-03-15

## 2023-08-27 NOTE — DISCHARGE NOTE ADULT - CARE PROVIDER_API CALL
Yes
Ted Rodriguez), Surgery  [address] [facility]  [address] [place] [address]  Phone: [phone]  Fax: [phone]

## 2023-10-04 ENCOUNTER — APPOINTMENT (OUTPATIENT)
Age: 63
End: 2023-10-04
Payer: COMMERCIAL

## 2023-10-04 VITALS
HEART RATE: 62 BPM | DIASTOLIC BLOOD PRESSURE: 109 MMHG | BODY MASS INDEX: 34.36 KG/M2 | SYSTOLIC BLOOD PRESSURE: 154 MMHG | HEIGHT: 69 IN | TEMPERATURE: 97.7 F | OXYGEN SATURATION: 96 % | WEIGHT: 232 LBS

## 2023-10-04 DIAGNOSIS — C64.9 MALIGNANT NEOPLASM OF UNSPECIFIED KIDNEY, EXCEPT RENAL PELVIS: ICD-10-CM

## 2023-10-04 DIAGNOSIS — R10.9 UNSPECIFIED ABDOMINAL PAIN: ICD-10-CM

## 2023-10-04 PROCEDURE — 99213 OFFICE O/P EST LOW 20 MIN: CPT

## 2023-10-15 PROBLEM — R10.9 FLANK PAIN: Status: ACTIVE | Noted: 2023-01-25

## 2023-10-15 PROBLEM — C64.9 RENAL CELL CANCER: Status: ACTIVE | Noted: 2022-04-20

## 2023-10-16 NOTE — ASU PATIENT PROFILE, ADULT - PATIENT'S GENDER IDENTITY
They can use Hydralazine only as needed for SBP above 150 mmHg for now and otherwise hold off on taking it.  We can move the follow up appointment ot the next 4-6 weeks. Male

## 2024-04-24 NOTE — PROGRESS NOTE ADULT - PROBLEM SELECTOR PLAN 3
LVM giving pt the below information.  
Pt is s/p gastric sleeve in 2017. Pt used to be on CPAP for COOPER however reports he no longer requires it and has not used it in years

## 2024-05-01 NOTE — ASU PATIENT PROFILE, ADULT - PAIN SCALE PREFERRED, PROFILE
Jardiance Approved    Prior Authorization Number: [id number]  Dates of approval: 5/1/2024-5/1/2025  Filling Pharmacy: [facility]  Additional Information: Pharmacy contacted - received paid claim.  Pharmacy will contact patient when medication is ready to be picked up.   
Pharmacy faxed in a request for prior authorization on:    Medication: Jardiance 25 mg   Dosage: 1 daily  Quantity requested:  90  Pharmacy for prescription has been selected.    Initiation of prior authorization needed.    
numerical 0-10

## 2024-06-10 ENCOUNTER — TRANSCRIPTION ENCOUNTER (OUTPATIENT)
Age: 64
End: 2024-06-10

## 2024-06-26 ENCOUNTER — APPOINTMENT (OUTPATIENT)
Dept: UROLOGY | Facility: CLINIC | Age: 64
End: 2024-06-26

## 2024-06-26 VITALS
HEART RATE: 76 BPM | TEMPERATURE: 98 F | OXYGEN SATURATION: 100 % | DIASTOLIC BLOOD PRESSURE: 84 MMHG | SYSTOLIC BLOOD PRESSURE: 138 MMHG

## 2024-06-26 PROCEDURE — 99214 OFFICE O/P EST MOD 30 MIN: CPT

## 2024-07-07 PROBLEM — N28.89 RENAL MASS: Status: RESOLVED | Noted: 2021-05-19 | Resolved: 2024-07-07

## 2024-07-26 ENCOUNTER — TRANSCRIPTION ENCOUNTER (OUTPATIENT)
Age: 64
End: 2024-07-26

## 2024-10-11 NOTE — DISCHARGE NOTE ADULT - NS MD DC FALL RISK RISK
Bed: [place] [address]  Expected date:   Expected time:   Means of arrival:   Comments:   For information on Fall & Injury Prevention, visit [email or web address]

## 2025-06-23 ENCOUNTER — NON-APPOINTMENT (OUTPATIENT)
Age: 65
End: 2025-06-23

## 2025-06-25 ENCOUNTER — APPOINTMENT (OUTPATIENT)
Age: 65
End: 2025-06-25
Payer: COMMERCIAL

## 2025-06-25 VITALS
HEIGHT: 67.8 IN | SYSTOLIC BLOOD PRESSURE: 122 MMHG | OXYGEN SATURATION: 97 % | BODY MASS INDEX: 32.04 KG/M2 | WEIGHT: 209 LBS | DIASTOLIC BLOOD PRESSURE: 81 MMHG | HEART RATE: 66 BPM | TEMPERATURE: 98.3 F

## 2025-06-25 PROCEDURE — G2211 COMPLEX E/M VISIT ADD ON: CPT | Mod: NC

## 2025-06-25 PROCEDURE — 99214 OFFICE O/P EST MOD 30 MIN: CPT

## 2025-06-30 LAB
APPEARANCE: CLEAR
BACTERIA UR CULT: NORMAL
BACTERIA: NEGATIVE /HPF
BILIRUBIN URINE: ABNORMAL
BLOOD URINE: NEGATIVE
CALCIUM OXALATE CRYSTALS: PRESENT
CAST: 9 /LPF
COLOR: NORMAL
EPITHELIAL CELLS: 2 /HPF
GLUCOSE QUALITATIVE U: NEGATIVE MG/DL
HYALINE CASTS: PRESENT
KETONES URINE: ABNORMAL MG/DL
LEUKOCYTE ESTERASE URINE: NEGATIVE
MICROSCOPIC-UA: NORMAL
MUCUS: PRESENT
NITRITE URINE: NEGATIVE
PH URINE: 5.5
PROTEIN URINE: NORMAL MG/DL
RED BLOOD CELLS URINE: 3 /HPF
REVIEW: NORMAL
SPECIFIC GRAVITY URINE: 1.03
UROBILINOGEN URINE: 1 MG/DL
WHITE BLOOD CELLS URINE: 0 /HPF

## (undated) DEVICE — DRSG TEGADERM 2.5X3"

## (undated) DEVICE — XI ARM FORCEP PROGRASP 8MM

## (undated) DEVICE — TAPE SILK 3"

## (undated) DEVICE — XI ARM FORCEP MARYLAND BIPOLAR

## (undated) DEVICE — DRAIN RESERVOIR FOR JACKSON PRATT 100CC CARDINAL

## (undated) DEVICE — APPLICATOR VISTASEAL LAP DUAL 45CM FLEX

## (undated) DEVICE — TROCAR COVIDIEN VERSASTEP PLUS 15MM STANDARD

## (undated) DEVICE — STAPLER COVIDIEN ENDO GIA STANDARD HANDLE

## (undated) DEVICE — XI NEEDLE DRIVER LARGE

## (undated) DEVICE — SYR LUER LOK 10CC

## (undated) DEVICE — SUT POLYSORB 0 36" GU-46

## (undated) DEVICE — PACK ROBOTIC LIJ

## (undated) DEVICE — XI ARM FORCEP TENACULUM

## (undated) DEVICE — SUT VLOC 180 2-0 9" GS-21 GREEN

## (undated) DEVICE — HANDSET ARGON

## (undated) DEVICE — ENDOCATCH 10MM SPECIMEN POUCH

## (undated) DEVICE — FOLEY TRAY 16FR 5CC LTX UMETER CLOSED

## (undated) DEVICE — INSUFFLATION NDL COVIDIEN STEP 14G FOR STEP/VERSASTEP

## (undated) DEVICE — XI VESSEL SEALER

## (undated) DEVICE — D HELP - CLEARVIEW CLEARIFY SYSTEM

## (undated) DEVICE — XI ARM GRASPER TIP UP FENESTRATED

## (undated) DEVICE — DRAIN JACKSON PRATT 10MM FLAT FULL NO TROCAR

## (undated) DEVICE — SUT POLYSORB 2-0 30" V-20 UNDYED

## (undated) DEVICE — ENDOCATCH II 15MM

## (undated) DEVICE — LUBRICATING JELLY ONESHOT 1.25OZ

## (undated) DEVICE — POSITIONER FOAM HEAD CRADLE (PINK)

## (undated) DEVICE — DRAPE INSTRUMENT POUCH 6.75" X 11"

## (undated) DEVICE — XI DRAPE COLUMN

## (undated) DEVICE — TROCAR SURGIQUEST AIRSEAL 12MMX100MM

## (undated) DEVICE — WARMING BLANKET LOWER ADULT

## (undated) DEVICE — SUT CLIP LAPRA-TY ABSORBABLE SIZE 0.118 TO 0.12" VIOLET

## (undated) DEVICE — XI DRAPE ARM

## (undated) DEVICE — GLV 7.5 PROTEXIS (WHITE)

## (undated) DEVICE — VENODYNE/SCD SLEEVE CALF MEDIUM

## (undated) DEVICE — SOL IRR POUR H2O 250ML

## (undated) DEVICE — XI ARM DISSECTOR CURVED BIPOLAR 8MM

## (undated) DEVICE — Device

## (undated) DEVICE — TUBING STRYKEFLOW II SUCTION / IRRIGATOR

## (undated) DEVICE — SUT VLOC 90 3-0 6" CV-23 UNDYED

## (undated) DEVICE — ELCTR HANDSWITCHING 5MM ABC

## (undated) DEVICE — XI ARM FORCEP FENESTRATED BIPOLAR 8MM

## (undated) DEVICE — XI TIP COVER

## (undated) DEVICE — DRSG OPSITE 13.75 X 4"

## (undated) DEVICE — XI SEAL UNIV 5- 8 MM

## (undated) DEVICE — WARMING BLANKET UPPER ADULT

## (undated) DEVICE — XI ARM PERMANENT CAUTERY HOOK

## (undated) DEVICE — TUBING AIRSEAL TRI-LUMEN FILTERED

## (undated) DEVICE — XI ARM CLIP APPLIER LARGE

## (undated) DEVICE — DRSG CURITY GAUZE SPONGE 4 X 4" 12-PLY

## (undated) DEVICE — SUT CAPROSYN 4-0 P-12 UNDYED

## (undated) DEVICE — XI ARM NEEDLE DRIVER LARGE

## (undated) DEVICE — LAP PAD 4 X 18"

## (undated) DEVICE — XI ARM PERMANENT CAUTERY SPATULA

## (undated) DEVICE — XI ARM CLIP APPLIER MEDIUM-LARGE

## (undated) DEVICE — XI OBTURATOR OPTICAL BLADELESS 8MM

## (undated) DEVICE — SUT POLYSORB 1 27" GS-21 UNDYED

## (undated) DEVICE — SUT POLYSORB 0 60" TIES UNDYED

## (undated) DEVICE — ELCTR LAPROSCOPIC FLEXIBLE ARGON COAG ONLY 28CM

## (undated) DEVICE — DRSG STERISTRIPS 0.5 X 4"

## (undated) DEVICE — XI ARM SCISSOR MONO CURVED